# Patient Record
Sex: MALE | Race: WHITE | NOT HISPANIC OR LATINO | Employment: OTHER | URBAN - METROPOLITAN AREA
[De-identification: names, ages, dates, MRNs, and addresses within clinical notes are randomized per-mention and may not be internally consistent; named-entity substitution may affect disease eponyms.]

---

## 2017-04-26 ENCOUNTER — OFFICE VISIT (OUTPATIENT)
Dept: MEDICAL GROUP | Facility: MEDICAL CENTER | Age: 47
End: 2017-04-26

## 2017-04-26 VITALS
RESPIRATION RATE: 14 BRPM | HEIGHT: 71 IN | TEMPERATURE: 98.4 F | OXYGEN SATURATION: 96 % | SYSTOLIC BLOOD PRESSURE: 202 MMHG | DIASTOLIC BLOOD PRESSURE: 110 MMHG | HEART RATE: 70 BPM | WEIGHT: 300.2 LBS | BODY MASS INDEX: 42.03 KG/M2

## 2017-04-26 DIAGNOSIS — R79.89 LOW TESTOSTERONE LEVEL IN MALE: ICD-10-CM

## 2017-04-26 DIAGNOSIS — Z00.00 ROUTINE GENERAL MEDICAL EXAMINATION AT A HEALTH CARE FACILITY: Primary | ICD-10-CM

## 2017-04-26 DIAGNOSIS — R06.00 DYSPNEA, UNSPECIFIED TYPE: ICD-10-CM

## 2017-04-26 DIAGNOSIS — F17.200 TOBACCO USE DISORDER: ICD-10-CM

## 2017-04-26 DIAGNOSIS — Z96.641 STATUS POST RIGHT HIP REPLACEMENT: ICD-10-CM

## 2017-04-26 DIAGNOSIS — I11.0 HYPERTENSIVE CARDIOMEGALY WITH HEART FAILURE (HCC): ICD-10-CM

## 2017-04-26 DIAGNOSIS — M54.50 CHRONIC BILATERAL LOW BACK PAIN WITHOUT SCIATICA: ICD-10-CM

## 2017-04-26 DIAGNOSIS — I50.32 CHRONIC DIASTOLIC CONGESTIVE HEART FAILURE (HCC): ICD-10-CM

## 2017-04-26 DIAGNOSIS — I10 ESSENTIAL HYPERTENSION: ICD-10-CM

## 2017-04-26 DIAGNOSIS — Z13.6 ENCOUNTER FOR LIPID SCREENING FOR CARDIOVASCULAR DISEASE: ICD-10-CM

## 2017-04-26 DIAGNOSIS — D75.1 POLYCYTHEMIA SECONDARY TO SMOKING: ICD-10-CM

## 2017-04-26 DIAGNOSIS — R53.82 CHRONIC FATIGUE: ICD-10-CM

## 2017-04-26 DIAGNOSIS — Z13.220 ENCOUNTER FOR LIPID SCREENING FOR CARDIOVASCULAR DISEASE: ICD-10-CM

## 2017-04-26 DIAGNOSIS — I16.0 HYPERTENSIVE URGENCY: ICD-10-CM

## 2017-04-26 DIAGNOSIS — Z13.1 DIABETES MELLITUS SCREENING: ICD-10-CM

## 2017-04-26 DIAGNOSIS — G47.33 OSA (OBSTRUCTIVE SLEEP APNEA): ICD-10-CM

## 2017-04-26 DIAGNOSIS — G89.29 CHRONIC BILATERAL LOW BACK PAIN WITHOUT SCIATICA: ICD-10-CM

## 2017-04-26 PROCEDURE — 99215 OFFICE O/P EST HI 40 MIN: CPT | Performed by: FAMILY MEDICINE

## 2017-04-26 RX ORDER — METOPROLOL TARTRATE 50 MG/1
50 TABLET, FILM COATED ORAL 2 TIMES DAILY
Qty: 90 TAB | Refills: 0 | Status: SHIPPED | OUTPATIENT
Start: 2017-04-26 | End: 2017-05-18 | Stop reason: SDUPTHER

## 2017-04-26 RX ORDER — FUROSEMIDE 40 MG/1
40 TABLET ORAL DAILY
Qty: 30 TAB | Refills: 0 | Status: ON HOLD | OUTPATIENT
Start: 2017-04-26 | End: 2017-05-10

## 2017-04-26 RX ORDER — LISINOPRIL 40 MG/1
20 TABLET ORAL 2 TIMES DAILY
Qty: 90 TAB | Refills: 0 | Status: SHIPPED | OUTPATIENT
Start: 2017-04-26 | End: 2017-05-01

## 2017-04-26 ASSESSMENT — PATIENT HEALTH QUESTIONNAIRE - PHQ9: CLINICAL INTERPRETATION OF PHQ2 SCORE: 1

## 2017-04-26 NOTE — MR AVS SNAPSHOT
"        Giacomo Grandazeynep   2017 8:20 AM   Office Visit   MRN: [id number]    Department:  [place] [address]   Dept Phone:  [phone]    Description:  Male : 1970   Provider:  Aaron Chan M.D.           Reason for Visit     Medication Management HP changed medication pt has weakness, balance off, dropping things.       Allergies as of 2017     Allergen Noted Reactions    Naproxen 2016   Rash    generalized    Flu Virus Vaccine 2013   Anaphylaxis    Olmesartan-Amlodipine-Hctz 2013       Irregular heart beat, irregular BP, blurred vision, vertigo (reaction= 3 weeks ago)      You were diagnosed with     Routine general medical examination at a health care facility   [V70.0.ICD-9-CM]  -  Primary     Essential hypertension   [6971701]       Chronic diastolic congestive heart failure (CMS-HCC)   [337262]       Hypertensive cardiomegaly with heart failure (CMS-HCC)   [3077466]       Hypertensive urgency   [309611]       BMI 40.0-44.9, adult (CMS-HCC)   [964763]       ADRIANNA (obstructive sleep apnea)   [881264]       Chronic bilateral low back pain without sciatica   [1551101]       Status post right hip replacement   [736878]       Essential hypertension, malignant   [401.0.ICD-9-CM]       Diabetes mellitus screening   [329181]       Encounter for lipid screening for cardiovascular disease   [3297998]       Dyspnea, unspecified type   [1947424]       Chronic fatigue   [729106]       Low testosterone level in male   [4159153]         Vital Signs     Blood Pressure Pulse Temperature Respirations Height Weight    202/110 mmHg 70 36.9 °C (98.4 °F) 14 1.803 m (5' 11\") 136.17 kg (300 lb 3.2 oz)    Body Mass Index Oxygen Saturation Smoking Status             41.89 kg/m2 96% Current Every Day Smoker         Basic Information     Date Of Birth Sex Race Ethnicity Preferred Language    1970 Male White Non- English      Problem List              ICD-10-CM Priority Class Noted - " Resolved    ADRIANNA (obstructive sleep apnea) G47.33   7/4/2013 - Present    HTN (hypertension) I10   7/4/2013 - Present    Hypertensive urgency I16.0   12/1/2016 - Present    Hypertensive cardiomegaly with heart failure (CMS-HCC) I11.0   12/2/2016 - Present    Tobacco use disorder F17.200   12/2/2016 - Present    Polycythemia secondary to smoking D75.1   12/2/2016 - Present    BMI 40.0-44.9, adult (CMS-HCC) Z68.41   12/2/2016 - Present    Chronic diastolic congestive heart failure (CMS-HCC) I50.32   4/26/2017 - Present    Chronic bilateral low back pain without sciatica M54.5, G89.29   4/26/2017 - Present    Status post right hip replacement Z96.641   4/26/2017 - Present    Dyspnea R06.00   4/26/2017 - Present    Chronic fatigue R53.82   4/26/2017 - Present    Low testosterone level in male E29.1   4/26/2017 - Present      Health Maintenance        Date Due Completion Dates    IMM DTaP/Tdap/Td Vaccine (1 - Tdap) 2/21/1987 ---    IMM PNEUMOCOCCAL 19-64 (ADULT) MEDIUM RISK SERIES (1 of 1 - PPSV23) 2/21/1987 ---    COLONOSCOPY 11/17/2019 11/17/2014 (Declined), 5/10/2009 (Done)    Override on 11/17/2014: Patient Declined    Override on 5/10/2009: Done            Current Immunizations     No immunizations on file.      Below and/or attached are the medications your provider expects you to take. Review all of your home medications and newly ordered medications with your provider and/or pharmacist. Follow medication instructions as directed by your provider and/or pharmacist. Please keep your medication list with you and share with your provider. Update the information when medications are discontinued, doses are changed, or new medications (including over-the-counter products) are added; and carry medication information at all times in the event of emergency situations     Allergies:  NAPROXEN - Rash     FLU VIRUS VACCINE - Anaphylaxis     OLMESARTAN-AMLODIPINE-HCTZ - (reactions not documented)               Medications   Valid as of: April 26, 2017 -  9:20 AM    Generic Name Brand Name Tablet Size Instructions for use    Cetirizine HCl (Tab) ZYRTEC 10 MG Take 10 mg by mouth 2 Times a Day.        DiazePAM (Tab) VALIUM 10 MG Take 10 mg by mouth 2 times a day as needed for Anxiety.        Furosemide (Tab) LASIX 40 MG Take 1 Tab by mouth every day.        Hydrocodone-Acetaminophen (Tab) NORCO  MG Take 1 Tab by mouth every 6 hours.        Lisinopril (Tab) PRINIVIL, ZESTRIL 40 MG Take 0.5 Tabs by mouth 2 times a day.        Metoprolol Tartrate (Tab) LOPRESSOR 50 MG Take 1 Tab by mouth 2 times a day.        Minoxidil (Tab) LONITEN 10 MG Take 1 Tab by mouth every day.        OxyCODONE HCl (Tab) OXY-IR 30 MG Take 1 Tab by mouth every four hours as needed for Moderate Pain.        .                 Medicines prescribed today were sent to:     [id number] - [place], [place] - [address] [address]    [address] [facility] [place] [address]    Phone: [phone] Fax: [phone]    Open 24 Hours?: No      Medication refill instructions:       If your prescription bottle indicates you have medication refills left, it is not necessary to call your provider’s office. Please contact your pharmacy and they will refill your medication.    If your prescription bottle indicates you do not have any refills left, you may request refills at any time through one of the following ways: The online [employer] system (except Urgent Care), by calling your provider’s office, or by asking your pharmacy to contact your provider’s office with a refill request. Medication refills are processed only during regular business hours and may not be available until the next business day. Your provider may request additional information or to have a follow-up visit with you prior to refilling your medication.   *Please Note: Medication refills are assigned a new Rx number when refilled electronically. Your pharmacy may indicate that no refills were authorized even  though a new prescription for the same medication is available at the pharmacy. Please request the medicine by name with the pharmacy before contacting your provider for a refill.        Your To Do List     Future Labs/Procedures Complete By Expires    COMP METABOLIC PANEL  As directed 4/26/2018    HEMOGLOBIN A1C  As directed 4/26/2018    LIPID PROFILE  As directed 4/26/2018    Comments:    Fasting at least 8hours    TESTOSTERONE F&T MALE ADULT  As directed 4/26/2018      Referral     A referral request has been sent to our patient care coordination department. Please allow 3-5 business days for us to process this request and contact you either by phone or mail. If you do not hear from us by the 5th business day, please call us at [phone].        Other Notes About Your Plan     SIGNED OA:   11/17/2014  LAST UDS: 11/17/2014             [employer] Access Code: Activation code not generated  Current [employer] Status: Active          Quit Tobacco Information     Do you want to quit using tobacco?    Quitting tobacco decreases risks of cancer, heart and lung disease, increases life expectancy, improves sense of taste and smell, and increases spending money, among other benefits.    If you are thinking about quitting, we can help.  • [facility] Quit Tobacco Program: [phone]  o Program occurs weekly for four weeks and includes pharmacist consultation on products to support quitting smoking or chewing tobacco. A provider referral is needed for pharmacist consultation.  • Tobacco Users Help Hotline: 7-904-QUIT-NOW (586-9518) or https://[place].quitlogix.org/  o Free, confidential telephone and online coaching for [place] residents. Sessions are designed on a schedule that is convenient for you. Eligible clients receive free nicotine replacement therapy.  • Nationally: www.smokefree.gov  o Information and professional assistance to support both immediate and long-term needs as you become, and remain, a non-smoker.  Smokefree.gov allows you to choose the help that best fits your needs.

## 2017-04-26 NOTE — ASSESSMENT & PLAN NOTE
"Patient presents today with a blood pressure reading of 202/110 mmHg, states that he is experiencing: generalized weakness, feels that his balance is off, is randomly dropping things from his hands, and wife states that \"he is not himself,\" otherwise asymptomatic. Patient states that he was admitted most recently in December 2016 for symptomatic hypertensive emergency, and review of records reveals that he had a negative nuclear medicine stress test.     Patient states that his medications were changed from his previous (Lisinopril + Tenormin) to current (Minoxidil, Labetalol, with Labetalol apparently to be self-titrated anywhere from Qday to TID based on BP readings).    Of note, review of recent records reveals that  echocardiogram shows normal systolic function with EF 55%, but with Grade II diastolic dysfunction which was consistent with mild concentric hypertrophy.  On the other hand, review of recent nuclear medicine stress test demonstrates no myocardial infarct or inducible ischemia.    ROS is NEGATIVE for dizziness, generalized weakness/fatigue, vision/hearing changes, jaw pain/paresthesias, BUE pain/paresthesias/numbness/weakness, chest pain/pressure, palpitations, dyspnea, RUQ abdominal pain, oliguria/anuria, BLE edema.  "

## 2017-04-27 ENCOUNTER — APPOINTMENT (OUTPATIENT)
Dept: LAB | Facility: MEDICAL CENTER | Age: 47
End: 2017-04-27
Attending: FAMILY MEDICINE

## 2017-04-28 NOTE — ASSESSMENT & PLAN NOTE
Listed for historical purposes.  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

## 2017-04-28 NOTE — PROGRESS NOTES
"Subjective:     Chief Complaint   Patient presents with   • Medication Management     HP changed medication pt has weakness, balance off, dropping things.        History of Present Illness:  Giacomo Hunter is a 47 y.o. male established patient who presents today to discuss blood pressure management, and also to :    BMI 40.0-44.9, adult (CMS-HCC)  Patient acknowledges diagnosis of morbid obesity, has tried to change his diet to \" eating\" as described in dietary history below.      ROS is NEGATIVE for: cold or heat intolerance, anxiety/depression, chest pain/pressure, hair thickening/coarsening/falling out/thinning, skin changes, diarrhea/constipation.    ROS is NEGATIVE for blurred vision, polydipsia, polyuria, diaphoresis, palpitations, fatigue, irritability, flank pain, BLE paresthesias.    Dietary History  ---  B: English Muffin + Hardboiled Eggs (~10eggs per week) OR Cereal (Kashi) OR Ramen Noodles    L: [employer] Atlantic (Cold cuts, swiss cheese + parmesan, no oil/vinegar, no salt)    D: Roasted chicken OR Fish, veggies (sauteed, no-salt substitute), salad     Pickled [place] or other preserved meats (Sardines in oil, lox on bagels + cream cheese) -- 1x/week    Cheese 2-3x per week    Pizza -- 1x every 2months    Hypertensive urgency  Patient presents today with a blood pressure reading of 202/110 mmHg, states that he is experiencing: generalized weakness, feels that his balance is off, is randomly dropping things from his hands, and wife states that \"he is not himself,\" otherwise asymptomatic. Patient states that he was admitted most recently in December 2016 for symptomatic hypertensive emergency, and review of records reveals that he had a negative nuclear medicine stress test.     Patient states that his medications were changed from his previous (Lisinopril + Tenormin) to current (Minoxidil, Labetalol, with Labetalol apparently to be self-titrated anywhere from Qday to TID based on BP readings).    Of " note, review of recent records reveals that  echocardiogram shows normal systolic function with EF 55%, but with Grade II diastolic dysfunction which was consistent with mild concentric hypertrophy.  On the other hand, review of recent nuclear medicine stress test demonstrates no myocardial infarct or inducible ischemia.    ROS is NEGATIVE for dizziness, generalized weakness/fatigue, vision/hearing changes, jaw pain/paresthesias, BUE pain/paresthesias/numbness/weakness, chest pain/pressure, palpitations, dyspnea, RUQ abdominal pain, oliguria/anuria, BLE edema.    Hypertensive cardiomegaly with heart failure (CMS-HCC)  Please see notes from same date of service 04/26/2017 re: hypertensive urgency.    HTN (hypertension)  Please see notes from same date of service 04/26/2017 re: Hypertensive Urgency.    Chronic diastolic congestive heart failure (CMS-HCC)  Patient states that--if he isn't taking diuretics--he gets dyspneic walking 50-100feet.  Additionally, patient would notice fluid accumulation in BLE ankles without the diuretics.  He states that Lasix was more effective than Metozalone.  Patient is taking Potassium supplements at 20mEq total per day.    Otherwise, please see notes from same date of service 04/26/2017 re: Hypertensive Urgency.    Chronic fatigue  Patient states that he is always tired.      ROS is NEGATIVE for generalized pallor, dizziness, lightheadedness, blurred vision, chest pain/pressure, palpitations, tachycardia, dyspnea, hematemesis, hemoptysis, diarrhea, hematochezia, melena, hematuria    ROS is NEGATIVE for: cold or heat intolerance, anxiety/depression, chest pain/pressure, hair thickening/coarsening/falling out/thinning, skin changes, diarrhea/constipation.    ROS is NEGATIVE for blurred vision, polydipsia, polyuria, diaphoresis, palpitations, fatigue, irritability, flank pain, BLE paresthesias.    Polycythemia secondary to smoking  Patient noted to have polycythemia in the past.  This  "could be secondary to underlying undiagnosed pulmonary issues vs. Effect of smoking.  Otherwise, please see notes from same date of service 04/26/2017 re: Fatigue.    Low testosterone level in male  Patient states that he was formerly told he had low levels of testosterone but \"my wife and I didn't want me to take the testosterone, at that time.\"     ROS is NEGATIVE for testicular trauma or shrinkage, use of anabolic steroids.    Tobacco use disorder  Patient has switched from smoking cigarettes to vaping.    ROS is POSITIVE for dyspnea on exertion, otherwise is NEGATIVE for fevers, chills, tachypnea, respiratory distress, cyanotic skin changes, daytime hypersomnolence, headaches upon awakening.    Status post right hip replacement  Listed for historical purposes.  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        Patient Active Problem List    Diagnosis Date Noted   • Chronic diastolic congestive heart failure (CMS-HCC) 04/26/2017   • Chronic bilateral low back pain without sciatica 04/26/2017   • Status post right hip replacement 04/26/2017   • Dyspnea 04/26/2017   • Chronic fatigue 04/26/2017   • Low testosterone level in male 04/26/2017   • Hypertensive cardiomegaly with heart failure (CMS-HCC) 12/02/2016   • Tobacco use disorder 12/02/2016   • Polycythemia secondary to smoking 12/02/2016   • BMI 40.0-44.9, adult (CMS-HCC) 12/02/2016   • Hypertensive urgency 12/01/2016   • ADRIANNA (obstructive sleep apnea) 07/04/2013   • HTN (hypertension) 07/04/2013       Additional History:   Allergies:    Naproxen; Flu virus vaccine; and Olmesartan-amlodipine-hctz     Current Medications:     Current Outpatient Prescriptions   Medication Sig Dispense Refill   • lisinopril (PRINIVIL, ZESTRIL) 40 MG tablet Take 0.5 Tabs by mouth 2 times a day. 90 Tab 0   • metoprolol (LOPRESSOR) 50 MG Tab Take 1 Tab by mouth 2 times " "a day. 90 Tab 0   • furosemide (LASIX) 40 MG Tab Take 1 Tab by mouth every day. 30 Tab 0   • diazepam (VALIUM) 10 MG tablet Take 10 mg by mouth 2 times a day as needed for Anxiety.     • oxycodone (OXY-IR) 30 MG immediate release tablet Take 1 Tab by mouth every four hours as needed for Moderate Pain. 120 Tab 0   • cetirizine (ZYRTEC) 10 MG TABS Take 10 mg by mouth 2 Times a Day.     • hydrocodone/acetaminophen (NORCO)  MG Tab Take 1 Tab by mouth every 6 hours.       No current facility-administered medications for this visit.        Social History:     Social History   Substance Use Topics   • Smoking status: Current Every Day Smoker -- 2.50 packs/day for 25 years     Types: Cigarettes     Start date: 01/01/1990   • Smokeless tobacco: Never Used      Comment: 2-3ppd [id number]; Currently Vaping   • Alcohol Use: 6.0 oz/week     7 Glasses of wine, 5 Cans of beer per week      Comment: 1 glass of wine with dinner       ROS:     - NOTE: All other systems reviewed and are negative, except as in HPI.     Objective:   Physical Exam:    Vitals: Blood pressure 202/110, pulse 70, temperature 36.9 °C (98.4 °F), resp. rate 14, height 1.803 m (5' 11\"), weight 136.17 kg (300 lb 3.2 oz), SpO2 96 %.   BMI: Body mass index is 41.89 kg/(m^2).   General/Constitutional: Vitals as above, Well nourished, well developed male in no acute distress   Head/Eyes: Head is grossly normal & atraumatic, bilateral conjunctivae clear and not injected, bilateral EOMI, bilateral PERRL   ENT: Bilateral external ears grossly normal in appearance, Hearing grossly intact, External nares normal in appearance and without discharge/bleeding   Respiratory: No respiratory distress, bilateral lungs are clear to ausculation in all lung fields (anterior/lateral/posterior), no wheezing/rhonchi/rales   Cardiovascular: Regular rate and rhythm without murmur/gallops/rubs, distal pulses are intact and equal bilaterally (radial, posterior tibial), bilateral lower " extremity trace pitting edema   MSK: Gait grossly normal & not antalgic   Integumentary: No apparent rashes   Psych: Judgment grossly appropriate, no apparent depression/anxiety    Health Maintenance:      - I have requested previous records, and will update accordingly.    Imaging/Labs:      - I have requested previous records, and will update accordingly.    Assessment and Plan:   1. Hypertensive urgency  2. Hypertensive cardiomegaly with heart failure (CMS-HCC)  3. Essential hypertension  Uncontrolled, mildly symptomatic.  Current BP therapy ineffective.  Stop Minoxidil, stop Labetalol.  Start Metoprolol + Lisinopril as below.  Labs as below to evaluate for comorbidities.  Urine microalbumin to creatinine ratio to evaluate for hypertensive or diabetic nephropathy.   - lisinopril (PRINIVIL, ZESTRIL) 40 MG tablet; Take 0.5 Tabs by mouth 2 times a day.  Dispense: 90 Tab; Refill: 0   - metoprolol (LOPRESSOR) 50 MG Tab; Take 1 Tab by mouth 2 times a day.  Dispense: 90 Tab; Refill: 0   - HEMOGLOBIN A1C; Future   - COMP METABOLIC PANEL; Future   - LIPID PROFILE; Future   - MICROALB/CREAT RATIO RAND. UR    4. Chronic diastolic congestive heart failure (CMS-HCC)  Uncontrolled   - furosemide (LASIX) 40 MG Tab; Take 1 Tab by mouth every day.  Dispense: 30 Tab; Refill: 0   - MICROALB/CREAT RATIO RAND. UR    5. BMI 40.0-44.9, adult (CMS-HCC)  6. ADRIANNA (obstructive sleep apnea)  7. Dyspnea, unspecified type  BMI uncontrolled.   Suspect ADRIANNA, possible OHS.  Polysomnography + PFTs to evaluate.   - REFERRAL TO SLEEP STUDIES   - PULMONARY FUNCTION TESTS Test requested: Complete Pulmonary Function Test    8. Chronic bilateral low back pain without sciatica  Uncontrolled.  Referral to pain clinic for further evaluation/management.   - REFERRAL TO PAIN CLINIC    9. Chronic fatigue  10. Polycythemia secondary to smoking  11. Low testosterone level in male  Uncontrolled.  Evalaute with labs as above, also for anemia vs. Low testosterone  as below.   - TESTOSTERONE F&T MALE ADULT; Future   - CBC WITH DIFFERENTIAL; Future    12. Tobacco use disorder  13. Status post right hip replacement  Both of these listed for historical purposes.    14. Routine general medical examination at a health care facility   - HEMOGLOBIN A1C; Future   - COMP METABOLIC PANEL; Future   - LIPID PROFILE; Future    15. Encounter for lipid screening for cardiovascular disease   - LIPID PROFILE; Future    16. Diabetes mellitus screening   - HEMOGLOBIN A1C; Future    NOTE: A total of 40minutes was spent in direct face-to-face time with the patient, of which over 50% of the time was spent in counseling and/or coordination of care, the contents of which are described in this note.    RTC in: 1 week for lab follow-up and management of chronic conditions..    PLEASE NOTE: This dictation was created using voice recognition software. I have made every reasonable attempt to correct obvious errors, but I expect that there are errors of grammar and possibly content that I did not discover before finalizing the note.

## 2017-04-28 NOTE — ASSESSMENT & PLAN NOTE
Patient has switched from smoking cigarettes to vaping.    ROS is POSITIVE for dyspnea on exertion, otherwise is NEGATIVE for fevers, chills, tachypnea, respiratory distress, cyanotic skin changes, daytime hypersomnolence, headaches upon awakening.

## 2017-04-28 NOTE — ASSESSMENT & PLAN NOTE
"Patient states that he was formerly told he had low levels of testosterone but \"my wife and I didn't want me to take the testosterone, at that time.\"     ROS is NEGATIVE for testicular trauma or shrinkage, use of anabolic steroids.  "

## 2017-04-28 NOTE — ASSESSMENT & PLAN NOTE
Patient states that he is always tired.      ROS is NEGATIVE for generalized pallor, dizziness, lightheadedness, blurred vision, chest pain/pressure, palpitations, tachycardia, dyspnea, hematemesis, hemoptysis, diarrhea, hematochezia, melena, hematuria    ROS is NEGATIVE for: cold or heat intolerance, anxiety/depression, chest pain/pressure, hair thickening/coarsening/falling out/thinning, skin changes, diarrhea/constipation.    ROS is NEGATIVE for blurred vision, polydipsia, polyuria, diaphoresis, palpitations, fatigue, irritability, flank pain, BLE paresthesias.

## 2017-04-28 NOTE — ASSESSMENT & PLAN NOTE
Patient noted to have polycythemia in the past.  This could be secondary to underlying undiagnosed pulmonary issues vs. Effect of smoking.  Otherwise, please see notes from same date of service 04/26/2017 re: Fatigue.

## 2017-04-28 NOTE — ASSESSMENT & PLAN NOTE
Patient states that--if he isn't taking diuretics--he gets dyspneic walking 50-100feet.  Additionally, patient would notice fluid accumulation in BLE ankles without the diuretics.  He states that Lasix was more effective than Metozalone.  Patient is taking Potassium supplements at 20mEq total per day.    Otherwise, please see notes from same date of service 04/26/2017 re: Hypertensive Urgency.

## 2017-05-01 ENCOUNTER — APPOINTMENT (OUTPATIENT)
Dept: RADIOLOGY | Facility: MEDICAL CENTER | Age: 47
End: 2017-05-01
Attending: EMERGENCY MEDICINE
Payer: COMMERCIAL

## 2017-05-01 ENCOUNTER — RESOLUTE PROFESSIONAL BILLING HOSPITAL PROF FEE (OUTPATIENT)
Dept: HOSPITALIST | Facility: MEDICAL CENTER | Age: 47
End: 2017-05-01
Payer: COMMERCIAL

## 2017-05-01 ENCOUNTER — HOSPITAL ENCOUNTER (OUTPATIENT)
Facility: MEDICAL CENTER | Age: 47
End: 2017-05-02
Attending: EMERGENCY MEDICINE | Admitting: HOSPITALIST
Payer: COMMERCIAL

## 2017-05-01 DIAGNOSIS — R07.9 CHEST PAIN, UNSPECIFIED TYPE: ICD-10-CM

## 2017-05-01 DIAGNOSIS — I50.32 CHRONIC DIASTOLIC CONGESTIVE HEART FAILURE (HCC): ICD-10-CM

## 2017-05-01 DIAGNOSIS — I10 ESSENTIAL HYPERTENSION: ICD-10-CM

## 2017-05-01 DIAGNOSIS — R06.00 DYSPNEA, UNSPECIFIED TYPE: ICD-10-CM

## 2017-05-01 PROBLEM — E66.01 MORBID OBESITY DUE TO EXCESS CALORIES (HCC): Status: ACTIVE | Noted: 2017-05-01

## 2017-05-01 PROBLEM — I50.20 SYSTOLIC HEART FAILURE, STAGE B (HCC): Status: ACTIVE | Noted: 2017-05-01

## 2017-05-01 PROBLEM — D75.1 POLYCYTHEMIA: Status: ACTIVE | Noted: 2017-05-01

## 2017-05-01 LAB
ALBUMIN SERPL BCP-MCNC: 4.7 G/DL (ref 3.2–4.9)
ALBUMIN/GLOB SERPL: 1.7 G/DL
ALP SERPL-CCNC: 59 U/L (ref 30–99)
ALT SERPL-CCNC: 61 U/L (ref 2–50)
ANION GAP SERPL CALC-SCNC: 10 MMOL/L (ref 0–11.9)
AST SERPL-CCNC: 25 U/L (ref 12–45)
BASOPHILS # BLD AUTO: 0.8 % (ref 0–1.8)
BASOPHILS # BLD: 0.09 K/UL (ref 0–0.12)
BILIRUB SERPL-MCNC: 1.2 MG/DL (ref 0.1–1.5)
BNP SERPL-MCNC: 134 PG/ML (ref 0–100)
BUN SERPL-MCNC: 27 MG/DL (ref 8–22)
CALCIUM SERPL-MCNC: 9 MG/DL (ref 8.5–10.5)
CHLORIDE SERPL-SCNC: 102 MMOL/L (ref 96–112)
CO2 SERPL-SCNC: 24 MMOL/L (ref 20–33)
CREAT SERPL-MCNC: 1.25 MG/DL (ref 0.5–1.4)
DEPRECATED D DIMER PPP IA-ACNC: <200 NG/ML(D-DU)
EKG IMPRESSION: NORMAL
EOSINOPHIL # BLD AUTO: 0.24 K/UL (ref 0–0.51)
EOSINOPHIL NFR BLD: 2 % (ref 0–6.9)
ERYTHROCYTE [DISTWIDTH] IN BLOOD BY AUTOMATED COUNT: 42.5 FL (ref 35.9–50)
EST. AVERAGE GLUCOSE BLD GHB EST-MCNC: 114 MG/DL
GFR SERPL CREATININE-BSD FRML MDRD: >60 ML/MIN/1.73 M 2
GLOBULIN SER CALC-MCNC: 2.8 G/DL (ref 1.9–3.5)
GLUCOSE SERPL-MCNC: 109 MG/DL (ref 65–99)
HBA1C MFR BLD: 5.6 % (ref 0–5.6)
HCT VFR BLD AUTO: 48.8 % (ref 42–52)
HGB BLD-MCNC: 17.6 G/DL (ref 14–18)
IMM GRANULOCYTES # BLD AUTO: 0.04 K/UL (ref 0–0.11)
IMM GRANULOCYTES NFR BLD AUTO: 0.3 % (ref 0–0.9)
LACTATE BLD-SCNC: 2.1 MMOL/L (ref 0.5–2)
LYMPHOCYTES # BLD AUTO: 0.88 K/UL (ref 1–4.8)
LYMPHOCYTES NFR BLD: 7.5 % (ref 22–41)
MAGNESIUM SERPL-MCNC: 1.6 MG/DL (ref 1.5–2.5)
MCH RBC QN AUTO: 32.4 PG (ref 27–33)
MCHC RBC AUTO-ENTMCNC: 36.1 G/DL (ref 33.7–35.3)
MCV RBC AUTO: 89.9 FL (ref 81.4–97.8)
MONOCYTES # BLD AUTO: 0.42 K/UL (ref 0–0.85)
MONOCYTES NFR BLD AUTO: 3.6 % (ref 0–13.4)
NEUTROPHILS # BLD AUTO: 10.06 K/UL (ref 1.82–7.42)
NEUTROPHILS NFR BLD: 85.8 % (ref 44–72)
NRBC # BLD AUTO: 0 K/UL
NRBC BLD AUTO-RTO: 0 /100 WBC
PLATELET # BLD AUTO: 126 K/UL (ref 164–446)
PMV BLD AUTO: 11.3 FL (ref 9–12.9)
POTASSIUM SERPL-SCNC: 4 MMOL/L (ref 3.6–5.5)
PROT SERPL-MCNC: 7.5 G/DL (ref 6–8.2)
RBC # BLD AUTO: 5.43 M/UL (ref 4.7–6.1)
SODIUM SERPL-SCNC: 136 MMOL/L (ref 135–145)
TROPONIN I SERPL-MCNC: 0.02 NG/ML (ref 0–0.04)
TROPONIN I SERPL-MCNC: 0.02 NG/ML (ref 0–0.04)
TSH SERPL DL<=0.005 MIU/L-ACNC: 0.87 UIU/ML (ref 0.3–3.7)
WBC # BLD AUTO: 11.7 K/UL (ref 4.8–10.8)

## 2017-05-01 PROCEDURE — 99285 EMERGENCY DEPT VISIT HI MDM: CPT

## 2017-05-01 PROCEDURE — 700117 HCHG RX CONTRAST REV CODE 255: Performed by: EMERGENCY MEDICINE

## 2017-05-01 PROCEDURE — 700102 HCHG RX REV CODE 250 W/ 637 OVERRIDE(OP): Performed by: HOSPITALIST

## 2017-05-01 PROCEDURE — 71010 DX-CHEST-PORTABLE (1 VIEW): CPT

## 2017-05-01 PROCEDURE — 80053 COMPREHEN METABOLIC PANEL: CPT

## 2017-05-01 PROCEDURE — G0378 HOSPITAL OBSERVATION PER HR: HCPCS

## 2017-05-01 PROCEDURE — 700102 HCHG RX REV CODE 250 W/ 637 OVERRIDE(OP): Performed by: INTERNAL MEDICINE

## 2017-05-01 PROCEDURE — 74175 CTA ABDOMEN W/CONTRAST: CPT

## 2017-05-01 PROCEDURE — 83036 HEMOGLOBIN GLYCOSYLATED A1C: CPT

## 2017-05-01 PROCEDURE — 700102 HCHG RX REV CODE 250 W/ 637 OVERRIDE(OP): Performed by: EMERGENCY MEDICINE

## 2017-05-01 PROCEDURE — A9270 NON-COVERED ITEM OR SERVICE: HCPCS | Performed by: INTERNAL MEDICINE

## 2017-05-01 PROCEDURE — 700101 HCHG RX REV CODE 250: Performed by: EMERGENCY MEDICINE

## 2017-05-01 PROCEDURE — 84443 ASSAY THYROID STIM HORMONE: CPT

## 2017-05-01 PROCEDURE — 36415 COLL VENOUS BLD VENIPUNCTURE: CPT

## 2017-05-01 PROCEDURE — A9270 NON-COVERED ITEM OR SERVICE: HCPCS | Performed by: HOSPITALIST

## 2017-05-01 PROCEDURE — 93005 ELECTROCARDIOGRAM TRACING: CPT

## 2017-05-01 PROCEDURE — 83735 ASSAY OF MAGNESIUM: CPT

## 2017-05-01 PROCEDURE — 85025 COMPLETE CBC W/AUTO DIFF WBC: CPT

## 2017-05-01 PROCEDURE — 96375 TX/PRO/DX INJ NEW DRUG ADDON: CPT

## 2017-05-01 PROCEDURE — A9270 NON-COVERED ITEM OR SERVICE: HCPCS | Performed by: EMERGENCY MEDICINE

## 2017-05-01 PROCEDURE — 96374 THER/PROPH/DIAG INJ IV PUSH: CPT

## 2017-05-01 PROCEDURE — 83880 ASSAY OF NATRIURETIC PEPTIDE: CPT

## 2017-05-01 PROCEDURE — 99220 PR INITIAL OBSERVATION CARE,LEVL III: CPT | Performed by: HOSPITALIST

## 2017-05-01 PROCEDURE — 85379 FIBRIN DEGRADATION QUANT: CPT

## 2017-05-01 PROCEDURE — 84484 ASSAY OF TROPONIN QUANT: CPT

## 2017-05-01 PROCEDURE — 700111 HCHG RX REV CODE 636 W/ 250 OVERRIDE (IP): Performed by: HOSPITALIST

## 2017-05-01 PROCEDURE — 83605 ASSAY OF LACTIC ACID: CPT

## 2017-05-01 RX ORDER — LISINOPRIL 20 MG/1
40 TABLET ORAL 2 TIMES DAILY
Status: DISCONTINUED | OUTPATIENT
Start: 2017-05-01 | End: 2017-05-02 | Stop reason: HOSPADM

## 2017-05-01 RX ORDER — HEPARIN SODIUM 5000 [USP'U]/ML
5000 INJECTION, SOLUTION INTRAVENOUS; SUBCUTANEOUS EVERY 8 HOURS
Status: DISCONTINUED | OUTPATIENT
Start: 2017-05-02 | End: 2017-05-02 | Stop reason: HOSPADM

## 2017-05-01 RX ORDER — HYDRALAZINE HYDROCHLORIDE 25 MG/1
50 TABLET, FILM COATED ORAL EVERY 6 HOURS
Status: DISCONTINUED | OUTPATIENT
Start: 2017-05-02 | End: 2017-05-02 | Stop reason: HOSPADM

## 2017-05-01 RX ORDER — LORAZEPAM 2 MG/ML
0.5 INJECTION INTRAMUSCULAR EVERY 6 HOURS PRN
Status: DISCONTINUED | OUTPATIENT
Start: 2017-05-01 | End: 2017-05-02 | Stop reason: HOSPADM

## 2017-05-01 RX ORDER — ZOLPIDEM TARTRATE 5 MG/1
5 TABLET ORAL
Status: DISCONTINUED | OUTPATIENT
Start: 2017-05-01 | End: 2017-05-02 | Stop reason: HOSPADM

## 2017-05-01 RX ORDER — AMOXICILLIN 250 MG
2 CAPSULE ORAL 2 TIMES DAILY
Status: DISCONTINUED | OUTPATIENT
Start: 2017-05-01 | End: 2017-05-02 | Stop reason: HOSPADM

## 2017-05-01 RX ORDER — FUROSEMIDE 10 MG/ML
80 INJECTION INTRAMUSCULAR; INTRAVENOUS
Status: DISCONTINUED | OUTPATIENT
Start: 2017-05-01 | End: 2017-05-01

## 2017-05-01 RX ORDER — LORAZEPAM 1 MG/1
0.5 TABLET ORAL EVERY 6 HOURS PRN
Status: DISCONTINUED | OUTPATIENT
Start: 2017-05-01 | End: 2017-05-02 | Stop reason: HOSPADM

## 2017-05-01 RX ORDER — ISOSORBIDE DINITRATE 10 MG/1
10 TABLET ORAL EVERY 8 HOURS
Status: DISCONTINUED | OUTPATIENT
Start: 2017-05-01 | End: 2017-05-01

## 2017-05-01 RX ORDER — POLYETHYLENE GLYCOL 3350 17 G/17G
1 POWDER, FOR SOLUTION ORAL
Status: DISCONTINUED | OUTPATIENT
Start: 2017-05-01 | End: 2017-05-02 | Stop reason: HOSPADM

## 2017-05-01 RX ORDER — CLONIDINE HYDROCHLORIDE 0.1 MG/1
0.1 TABLET ORAL EVERY 6 HOURS PRN
Status: DISCONTINUED | OUTPATIENT
Start: 2017-05-01 | End: 2017-05-02 | Stop reason: HOSPADM

## 2017-05-01 RX ORDER — FUROSEMIDE 10 MG/ML
20 INJECTION INTRAMUSCULAR; INTRAVENOUS
Status: DISCONTINUED | OUTPATIENT
Start: 2017-05-02 | End: 2017-05-02 | Stop reason: HOSPADM

## 2017-05-01 RX ORDER — BISACODYL 10 MG
10 SUPPOSITORY, RECTAL RECTAL
Status: DISCONTINUED | OUTPATIENT
Start: 2017-05-01 | End: 2017-05-02 | Stop reason: HOSPADM

## 2017-05-01 RX ORDER — LABETALOL HYDROCHLORIDE 5 MG/ML
10 INJECTION, SOLUTION INTRAVENOUS EVERY 4 HOURS PRN
Status: DISCONTINUED | OUTPATIENT
Start: 2017-05-01 | End: 2017-05-02 | Stop reason: HOSPADM

## 2017-05-01 RX ORDER — DIAZEPAM 5 MG/1
10 TABLET ORAL 2 TIMES DAILY PRN
Status: DISCONTINUED | OUTPATIENT
Start: 2017-05-01 | End: 2017-05-02 | Stop reason: HOSPADM

## 2017-05-01 RX ORDER — CETIRIZINE HYDROCHLORIDE 10 MG/1
10 TABLET ORAL DAILY
Status: DISCONTINUED | OUTPATIENT
Start: 2017-05-02 | End: 2017-05-02 | Stop reason: HOSPADM

## 2017-05-01 RX ORDER — NITROGLYCERIN 0.4 MG/1
0.4 TABLET SUBLINGUAL ONCE
Status: COMPLETED | OUTPATIENT
Start: 2017-05-01 | End: 2017-05-01

## 2017-05-01 RX ORDER — AMLODIPINE BESYLATE 5 MG/1
5 TABLET ORAL
Status: DISCONTINUED | OUTPATIENT
Start: 2017-05-01 | End: 2017-05-01

## 2017-05-01 RX ORDER — PROMETHAZINE HYDROCHLORIDE 25 MG/1
12.5-25 TABLET ORAL EVERY 4 HOURS PRN
Status: DISCONTINUED | OUTPATIENT
Start: 2017-05-01 | End: 2017-05-02 | Stop reason: HOSPADM

## 2017-05-01 RX ORDER — DOXAZOSIN 2 MG/1
2 TABLET ORAL
Status: DISCONTINUED | OUTPATIENT
Start: 2017-05-01 | End: 2017-05-02 | Stop reason: HOSPADM

## 2017-05-01 RX ORDER — ACETAMINOPHEN 325 MG/1
650 TABLET ORAL EVERY 6 HOURS PRN
Status: DISCONTINUED | OUTPATIENT
Start: 2017-05-01 | End: 2017-05-02 | Stop reason: HOSPADM

## 2017-05-01 RX ORDER — HYDROCODONE BITARTRATE AND ACETAMINOPHEN 10; 325 MG/1; MG/1
1 TABLET ORAL EVERY 6 HOURS
Status: DISCONTINUED | OUTPATIENT
Start: 2017-05-01 | End: 2017-05-02 | Stop reason: HOSPADM

## 2017-05-01 RX ORDER — OXYCODONE HYDROCHLORIDE 10 MG/1
30 TABLET ORAL EVERY 6 HOURS PRN
Status: DISCONTINUED | OUTPATIENT
Start: 2017-05-01 | End: 2017-05-02 | Stop reason: HOSPADM

## 2017-05-01 RX ORDER — PROMETHAZINE HYDROCHLORIDE 25 MG/1
12.5-25 SUPPOSITORY RECTAL EVERY 4 HOURS PRN
Status: DISCONTINUED | OUTPATIENT
Start: 2017-05-01 | End: 2017-05-02 | Stop reason: HOSPADM

## 2017-05-01 RX ORDER — LISINOPRIL 40 MG/1
40 TABLET ORAL 2 TIMES DAILY
COMMUNITY
End: 2017-05-18 | Stop reason: SDUPTHER

## 2017-05-01 RX ORDER — HYDROCODONE BITARTRATE AND ACETAMINOPHEN 10; 325 MG/1; MG/1
1 TABLET ORAL EVERY 6 HOURS
Status: DISCONTINUED | OUTPATIENT
Start: 2017-05-02 | End: 2017-05-01

## 2017-05-01 RX ORDER — LABETALOL HYDROCHLORIDE 5 MG/ML
20 INJECTION, SOLUTION INTRAVENOUS ONCE
Status: COMPLETED | OUTPATIENT
Start: 2017-05-01 | End: 2017-05-01

## 2017-05-01 RX ORDER — ONDANSETRON 2 MG/ML
4 INJECTION INTRAMUSCULAR; INTRAVENOUS EVERY 4 HOURS PRN
Status: DISCONTINUED | OUTPATIENT
Start: 2017-05-01 | End: 2017-05-02 | Stop reason: HOSPADM

## 2017-05-01 RX ORDER — ONDANSETRON 4 MG/1
4 TABLET, ORALLY DISINTEGRATING ORAL EVERY 4 HOURS PRN
Status: DISCONTINUED | OUTPATIENT
Start: 2017-05-01 | End: 2017-05-02 | Stop reason: HOSPADM

## 2017-05-01 RX ADMIN — FUROSEMIDE 80 MG: 10 INJECTION, SOLUTION INTRAVENOUS at 20:50

## 2017-05-01 RX ADMIN — DOXAZOSIN 2 MG: 2 TABLET ORAL at 23:38

## 2017-05-01 RX ADMIN — IOHEXOL 100 ML: 350 INJECTION, SOLUTION INTRAVENOUS at 18:21

## 2017-05-01 RX ADMIN — ISOSORBIDE DINITRATE 10 MG: 10 TABLET ORAL at 22:00

## 2017-05-01 RX ADMIN — LABETALOL HYDROCHLORIDE 20 MG: 5 INJECTION INTRAVENOUS at 17:32

## 2017-05-01 RX ADMIN — HYDRALAZINE HYDROCHLORIDE 50 MG: 50 TABLET ORAL at 22:00

## 2017-05-01 RX ADMIN — LISINOPRIL 40 MG: 20 TABLET ORAL at 20:36

## 2017-05-01 RX ADMIN — HYDROCODONE BITARTRATE AND ACETAMINOPHEN 1 TABLET: 10; 325 TABLET ORAL at 20:54

## 2017-05-01 RX ADMIN — NITROGLYCERIN 0.4 MG: 0.4 TABLET SUBLINGUAL at 17:25

## 2017-05-01 RX ADMIN — METOPROLOL TARTRATE 50 MG: 25 TABLET, FILM COATED ORAL at 23:38

## 2017-05-01 ASSESSMENT — ENCOUNTER SYMPTOMS
SHORTNESS OF BREATH: 1
VOMITING: 0
NAUSEA: 0
ORTHOPNEA: 1
ABDOMINAL PAIN: 0
DIARRHEA: 0
BACK PAIN: 1

## 2017-05-01 ASSESSMENT — PAIN SCALES - GENERAL
PAINLEVEL_OUTOF10: 0
PAINLEVEL_OUTOF10: 9
PAINLEVEL_OUTOF10: 0
PAINLEVEL_OUTOF10: 0

## 2017-05-01 ASSESSMENT — PAIN SCALES - WONG BAKER
WONGBAKER_NUMERICALRESPONSE: DOESN'T HURT AT ALL

## 2017-05-01 ASSESSMENT — LIFESTYLE VARIABLES
EVER_SMOKED: NEVER
DO YOU DRINK ALCOHOL: NO

## 2017-05-01 NOTE — ED NOTES
Pt rounded on in senior lounge for increased respiratory rate and WOB. VS obtained. Charge RN is aware of pt. Pt informed of plan of care. 96% on RA, , /105. Instructed on deep breathing.

## 2017-05-01 NOTE — ED NOTES
"Ambulatory to triage with   Chief Complaint   Patient presents with   • Shortness of Breath     Sudden onset of SOB today   • Dizziness   • Hypertension     265/178 at home. Took an \"extra BP medication\" and BP is now 152/101   • Arm Pain     Bilateral arm pain   EKG completed. Pt and wife having difficulty answering questions and reporting hx. Charge RN is aware of pt. Pt placed in [facility].   HR 98; RR 26.       "

## 2017-05-01 NOTE — IP AVS SNAPSHOT
" Home Care Instructions                                                                                                                  Name:Giacomo Hunter  Medical Record Number:[id number]  CSN: [id number]    YOB: 1970   Age: 47 y.o.  Sex: male  HT:1.803 m (5' 11\") WT: 135 kg (297 lb 9.9 oz)          Admit Date: 5/1/2017     Discharge Date:   Today's Date: 5/2/2017  Attending Doctor:  Dio Gould M.D.                  Allergies:  Naproxen; Flu virus vaccine; and Olmesartan-amlodipine-hctz            Discharge Instructions       Discharge Instructions    Discharged to home by car with relative. Discharged via walking, hospital escort: Yes.  Special equipment needed: Not Applicable    Be sure to schedule a follow-up appointment with your primary care doctor or any specialists as instructed.     Discharge Plan:   Diet Plan: Discussed  Activity Level: Discussed  Confirmed Follow up Appointment: Patient to Call and Schedule Appointment  Confirmed Symptoms Management: Discussed  Medication Reconciliation Updated: Yes  Influenza Vaccine Indication: Patient Refuses    I understand that a diet low in cholesterol, fat, and sodium is recommended for good health. Unless I have been given specific instructions below for another diet, I accept this instruction as my diet prescription.   Other diet: Heart Healthy    Special Instructions: Follow up with primary care physician, sleep study and cardiology.  Activity as tolerated.  · Is patient discharged on Warfarin / Coumadin?   No     · Is patient Post Blood Transfusion?  No    Depression / Suicide Risk    As you are discharged from this [facility] facility, it is important to learn how to keep safe from harming yourself.    Recognize the warning signs:  · Abrupt changes in personality, positive or negative- including increase in energy   · Giving away possessions  · Change in eating patterns- significant weight changes-  positive or negative  · Change in " sleeping patterns- unable to sleep or sleeping all the time   · Unwillingness or inability to communicate  · Depression  · Unusual sadness, discouragement and loneliness  · Talk of wanting to die  · Neglect of personal appearance   · Rebelliousness- reckless behavior  · Withdrawal from people/activities they love  · Confusion- inability to concentrate     If you or a loved one observes any of these behaviors or has concerns about self-harm, here's what you can do:  · Talk about it- your feelings and reasons for harming yourself  · Remove any means that you might use to hurt yourself (examples: pills, rope, extension cords, firearm)  · Get professional help from the community (Mental Health, Substance Abuse, psychological counseling)  · Do not be alone:Call your Safe Contact- someone whom you trust who will be there for you.  · Call your local CRISIS HOTLINE 739-5653 or 929-044-9793  · Call your local Children's Mobile Crisis Response Team [place] (866) 775-3166 or www."[employer]"  · Call the toll free National Suicide Prevention Hotlines   · National Suicide Prevention Lifeline 248-012-KRMF (1214)  · National Hope Line Network 800-SUICIDE (457-2503)  Chest Pain Observation  It is often hard to give a specific diagnosis for the cause of chest pain. Among other possibilities your symptoms might be caused by inadequate oxygen delivery to your heart (angina). Angina that is not treated or evaluated can lead to a heart attack (myocardial infarction) or death.  Blood tests, electrocardiograms, and X-rays may have been done to help determine a possible cause of your chest pain. After evaluation and observation, your health care provider has determined that it is unlikely your pain was caused by an unstable condition that requires hospitalization. However, a full evaluation of your pain may need to be completed, with additional diagnostic testing as directed. It is very important to keep your follow-up  appointments. Not keeping your follow-up appointments could result in permanent heart damage, disability, or death. If there is any problem keeping your follow-up appointments, you must call your health care provider.  HOME CARE INSTRUCTIONS   Due to the slight chance that your pain could be angina, it is important to follow your health care provider's treatment plan and also maintain a healthy lifestyle:  · Maintain or work toward achieving a healthy weight.  · Stay physically active and exercise regularly.  · Decrease your salt intake.  · Eat a balanced, healthy diet. Talk to a dietitian to learn about heart-healthy foods.  · Increase your fiber intake by including whole grains, vegetables, fruits, and nuts in your diet.  · Avoid situations that cause stress, anger, or depression.  · Take medicines as advised by your health care provider. Report any side effects to your health care provider. Do not stop medicines or adjust the dosages on your own.  · Quit smoking. Do not use nicotine patches or gum until you check with your health care provider.  · Keep your blood pressure, blood sugar, and cholesterol levels within normal limits.  · Limit alcohol intake to no more than 1 drink per day for women who are not pregnant and 2 drinks per day for men.  · Do not abuse drugs.  SEEK IMMEDIATE MEDICAL CARE IF:  You have severe chest pain or pressure which may include symptoms such as:  · You feel pain or pressure in your arms, neck, jaw, or back.  · You have severe back or abdominal pain, feel sick to your stomach (nauseous), or throw up (vomit).  · You are sweating profusely.  · You are having a fast or irregular heartbeat.  · You feel short of breath while at rest.  · You notice increasing shortness of breath during rest, sleep, or with activity.  · You have chest pain that does not get better after rest or after taking your usual medicine.  · You wake from sleep with chest pain.  · You are unable to sleep because you  cannot breathe.  · You develop a frequent cough or you are coughing up blood.  · You feel dizzy, faint, or experience extreme fatigue.  · You develop severe weakness, dizziness, fainting, or chills.  Any of these symptoms may represent a serious problem that is an emergency. Do not wait to see if the symptoms will go away. Call your local emergency services (911 in the [place] Do not drive yourself to the hospital.  MAKE SURE YOU:  · Understand these instructions.  · Will watch your condition.  · Will get help right away if you are not doing well or get worse.     This information is not intended to replace advice given to you by your health care provider. Make sure you discuss any questions you have with your health care provider.     Document Released: 01/20/2012 Document Revised: 12/23/2014 Document Reviewed: 06/19/2014  [employer] Interactive Patient Education ©2016 [employer]    Sleep Studies  A sleep study (polysomnogram) is a series of tests done while you are sleeping. It can show how well you sleep. This can help your health care provider diagnose a sleep disorder and show how severe your sleep disorder is. A sleep study may lead to treatment that will help you sleep better and prevent other medical problems caused by poor sleep.  If you have a sleep disorder, you may also be at risk for:   · Sleep-related accidents.  · High blood pressure.  · Heart disease.  · Stroke.  · Other medical conditions.  Sleep disorders are common. Your health care provider may suspect a sleep disorder if you:  · Have loud snoring most nights.  · Have brief periods when you stop breathing at night.  · Feel sleepy on most days.  · Fall asleep suddenly during the day.  · Have trouble falling asleep or staying asleep.  · Feel like you need to move your legs when trying to fall asleep.  · Have dreams that seem very real shortly after falling asleep.  · Feel like you cannot move when you first wake up.  WHICH TESTS WILL I NEED TO  HAVE?   Most sleep studies last all night and include these tests:   · Recordings of your brain activity.  · Recordings of your eye movements.  · Recording of your heart rate and rhythm.  · Blood pressure readings.  · Readings of the amount of oxygen in your blood.  · Measurements of your chest and belly movement as you breathe during sleep.  If you have signs of the sleep disorder called sleep apnea during your test, you may get a mask to wear for the second half of the night.   · The mask provides continuous positive airway pressure (CPAP). This may improve sleep apnea significantly.  · You will then have all tests done again with the mask in place to see if your measurements and recordings change.  HOW ARE SLEEP STUDIES DONE?  Most sleep studies are done over one full night of sleep.   · You will arrive at the study center in the evening and can go home in the morning.  · Bring your pajamas and toothbrush.  · Do not have caffeine on the day of your sleep study.  · Your health care provider will let you know if you need to stop taking any of your regular medicines before the test.  To do the tests included in a polysomnogram, you will have:  · Round, sticky patches with sensors attached to recording wires (electrodes) placed on your scalp, face, chest, and limbs.  · Wires from all the electrodes and sensors run from your bed to a computer. The wires can be taken off and put back on if you need to get out of bed to go to the bathroom.  · A sensor placed over your nose to measure airflow.  · A finger clip put on one finger to measure your blood oxygen level.  · A belt around your belly and a belt around your chest to measure breathing movements.  WHERE ARE SLEEP STUDIES DONE?   Sleep studies are done at sleep centers. A sleep center may be inside a hospital, office, or clinic.   The room where you have the study may look like a hospital room or a hotel room. The health care providers doing the study may come in and  out of the room during the study. Most of the time, they will be in another room monitoring your test.   HOW IS INFORMATION FROM SLEEP STUDIES HELPFUL?  A polysomnogram can be used along with your medical history and a physical exam to diagnose conditions, such as:  · Sleep apnea.  · Restless legs syndrome.  · Sleep-related seizure disorders.  · Sleep-related movement disorders.  A medical doctor who specializes in sleep will evaluate your sleep study. The specialist will share the results with your primary health care provider. Treatments based on your sleep study may include:  · Improving your sleep habits (sleep hygiene).  · Wearing a CPAP mask.  · Wearing an oral device at night to improve breathing and reduce snoring.  · Taking medicine for:  ¨ Restless legs syndrome.  ¨ Sleep-related seizure disorder.  ¨ Sleep-related movement disorder.     This information is not intended to replace advice given to you by your health care provider. Make sure you discuss any questions you have with your health care provider.     Document Released: 06/23/2004 Document Revised: 01/08/2016 Document Reviewed: 02/23/2015  [employer] Interactive Patient Education ©2016 [employer]      Your appointments     May 03, 2017  7:00 AM   Adult Draw/Collection with [facility]   [facility] (--)    [address]  [place] [address]   [phone]            May 04, 2017  1:40 PM   HOSPITAL FOLLOW UP with Adelfo Mijares M.D.   [facility] (--)    [address]  [place] [id number] [phone]            May 10, 2017  3:00 PM   Established Patient with Aaron Chan M.D.   [facility]    [address]  [address]  [place] [id number] [phone]           You will be receiving a confirmation call a few days before your appointment from our automated call confirmation system.              Follow-up  Information     1. Follow up with Adelfo Mijares M.D.. Call in 1 day.    Specialty:  Interventional Cardiology    Why:  to make follow-up appointment for further medication mgmt    Contact information    [address]  [address]  [place] [address] [phone]          2. Follow up with Aaron Chan M.D.. Go on 5/10/2017.    Specialty:  Family Medicine    Why:  Let them know about recent hospitalization and that it was recommended that you obtain a prostate screening exam    Contact information    [address]  [address]  [place] [address] [phone]           Discharge Medication Instructions:    Below are the medications your physician expects you to take upon discharge:    Review all your home medications and newly ordered medications with your doctor and/or pharmacist. Follow medication instructions as directed by your doctor and/or pharmacist.    Please keep your medication list with you and share with your physician.               Medication List      START taking these medications        Instructions    Morning Afternoon Evening Bedtime    amlodipine 10 MG Tabs   Commonly known as:  NORVASC        Doctor's comments:  Please call your Cardiologist if you do not tolerate this medication.   Take 1 Tab by mouth every day.   Dose:  10 mg                        doxazosin 2 MG Tabs   Last time this was given:  2 mg on 5/1/2017 11:38 PM   Commonly known as:  CARDURA        Doctor's comments:  Hold for Systolic Blood pressure (top number) < 120   Take 1 Tab by mouth every bedtime.   Dose:  2 mg                        hydrALAZINE 50 MG Tabs   Last time this was given:  50 mg on 5/2/2017  6:12 AM   Commonly known as:  APRESOLINE        Take 1 Tab by mouth 3 times a day.   Dose:  50 mg                          CONTINUE taking these medications        Instructions    Morning Afternoon Evening Bedtime    cetirizine 10 MG Tabs   Last time this was given:  10 mg on 5/2/2017  8:44 AM   Commonly known as:   ZYRTEC        Take 10 mg by mouth every day.   Dose:  10 mg                        diazepam 10 MG tablet   Last time this was given:  10 mg on 5/2/2017  9:07 AM   Commonly known as:  VALIUM        Take 10 mg by mouth 2 times a day as needed for Anxiety.   Dose:  10 mg                        furosemide 40 MG Tabs   Commonly known as:  LASIX        Take 1 Tab by mouth every day.   Dose:  40 mg                        hydrocodone/acetaminophen  MG Tabs   Last time this was given:  1 Tab on 5/2/2017  8:47 AM   Commonly known as:  NORCO        Take 1-2 Tabs by mouth every 4 hours.   Dose:  1-2 Tab                        lisinopril 40 MG tablet   Last time this was given:  40 mg on 5/2/2017  8:45 AM   Commonly known as:  PRINIVIL, ZESTRIL        Take 40 mg by mouth 2 times a day.   Dose:  40 mg                        metoprolol 50 MG Tabs   Last time this was given:  50 mg on 5/2/2017  8:44 AM   Commonly known as:  LOPRESSOR        Take 1 Tab by mouth 2 times a day.   Dose:  50 mg                        oxycodone 30 MG immediate release tablet   Commonly known as:  OXY-IR        Doctor's comments:  Last refill.  Must establish with physiatry.   Take 1 Tab by mouth every four hours as needed for Moderate Pain.   Dose:  30 mg                             Where to Get Your Medications      Information about where to get these medications is not yet available     ! Ask your nurse or doctor about these medications    - amlodipine 10 MG Tabs  - doxazosin 2 MG Tabs  - hydrALAZINE 50 MG Tabs            Instructions           Diet / Nutrition:    Follow any diet instructions given to you by your doctor or the dietician, including how much salt (sodium) you are allowed each day.    If you are overweight, talk to your doctor about a weight reduction plan.    Activity:    Remain physically active following your doctor's instructions about exercise and activity.    Rest often.     Any time you become even a little tired or short  of breath, SIT DOWN and rest.    Worsening Symptoms:    Report any of the following signs and symptoms to the doctor's office immediately:    *Pain of jaw, arm, or neck  *Chest pain not relieved by medication                               *Dizziness or loss of consciousness  *Difficulty breathing even when at rest   *More tired than usual                                       *Bleeding drainage or swelling of surgical site  *Swelling of feet, ankles, legs or stomach                 *Fever (>100ºF)  *Pink or blood tinged sputum  *Weight gain (3lbs/day or 5lbs /week)           *Shock from internal defibrillator (if applicable)  *Palpitations or irregular heartbeats                *Cool and/or numb extremities    Stroke Awareness    Common Risk Factors for Stroke include:    Age  Atrial Fibrillation  Carotid Artery Stenosis  Diabetes Mellitus  Excessive alcohol consumption  High blood pressure  Overweight   Physical inactivity  Smoking    Warning signs and symptoms of a stroke include:    *Sudden numbness or weakness of the face, arm or leg (especially on one side of the body).  *Sudden confusion, trouble speaking or understanding.  *Sudden trouble seeing in one or both eyes.  *Sudden trouble walking, dizziness, loss of balance or coordination.Sudden severe headache with no known cause.    It is very important to get treatment quickly when a stroke occurs. If you experience any of the above warning signs, call 911 immediately.                   Disclaimer         Quit Smoking / Tobacco Use:    I understand the use of any tobacco products increases my chance of suffering from future heart disease or stroke and could cause other illnesses which may shorten my life. Quitting the use of tobacco products is the single most important thing I can do to improve my health. For further information on smoking / tobacco cessation call a Toll Free Quit Line at 1-383.614.6378 (*National Cancer [place]) or 1-214.858.4444 (American  Lung Association) or you can access the web based program at www.lungusa.org.    [place] Tobacco Users Help Line:  (593) 581-7601       Toll Free: 1-864.773.7042  Quit Tobacco Program [facility] (023)405-6927    Crisis Hotline:    [place] Crisis Hotline:  0-083-CSLKWYF or 1-333.728.2646    [place] Crisis Hotline:    1-189.917.7921 or 320-692-2226    Discharge Survey:   Thank you for choosing [facility]. We hope we did everything we could to make your hospital stay a pleasant one. You may be receiving a phone survey and we would appreciate your time and participation in answering the questions. Your input is very valuable to us in our efforts to improve our service to our patients and their families.        My signature on this form indicates that:    1. I have reviewed and understand the above information.  2. My questions regarding this information have been answered to my satisfaction.  3. I have formulated a plan with my discharge nurse to obtain my prescribed medications for home.                  Disclaimer         __________________________________                     __________       ________                       Patient Signature                                                 Date                    Time

## 2017-05-02 VITALS
TEMPERATURE: 98 F | HEART RATE: 77 BPM | SYSTOLIC BLOOD PRESSURE: 127 MMHG | OXYGEN SATURATION: 93 % | WEIGHT: 297.62 LBS | DIASTOLIC BLOOD PRESSURE: 60 MMHG | RESPIRATION RATE: 18 BRPM | HEIGHT: 71 IN | BODY MASS INDEX: 41.67 KG/M2

## 2017-05-02 PROBLEM — R07.9 CHEST PAIN IN ADULT: Status: RESOLVED | Noted: 2017-05-01 | Resolved: 2017-05-02

## 2017-05-02 LAB
ALBUMIN SERPL BCP-MCNC: 4.2 G/DL (ref 3.2–4.9)
ALBUMIN/GLOB SERPL: 1.5 G/DL
ALP SERPL-CCNC: 50 U/L (ref 30–99)
ALT SERPL-CCNC: 47 U/L (ref 2–50)
ANION GAP SERPL CALC-SCNC: 13 MMOL/L (ref 0–11.9)
APPEARANCE UR: CLEAR
AST SERPL-CCNC: 24 U/L (ref 12–45)
BILIRUB SERPL-MCNC: 1.1 MG/DL (ref 0.1–1.5)
BILIRUB UR QL STRIP.AUTO: NEGATIVE
BUN SERPL-MCNC: 27 MG/DL (ref 8–22)
CALCIUM SERPL-MCNC: 8.7 MG/DL (ref 8.5–10.5)
CHLORIDE SERPL-SCNC: 102 MMOL/L (ref 96–112)
CHOLEST SERPL-MCNC: 121 MG/DL (ref 100–199)
CO2 SERPL-SCNC: 21 MMOL/L (ref 20–33)
COLOR UR: YELLOW
CREAT SERPL-MCNC: 1.2 MG/DL (ref 0.5–1.4)
CULTURE IF INDICATED INDCX: NO UA CULTURE
EKG IMPRESSION: NORMAL
GFR SERPL CREATININE-BSD FRML MDRD: >60 ML/MIN/1.73 M 2
GLOBULIN SER CALC-MCNC: 2.8 G/DL (ref 1.9–3.5)
GLUCOSE SERPL-MCNC: 134 MG/DL (ref 65–99)
GLUCOSE UR STRIP.AUTO-MCNC: NEGATIVE MG/DL
HDLC SERPL-MCNC: 22 MG/DL
KETONES UR STRIP.AUTO-MCNC: NEGATIVE MG/DL
LACTATE BLD-SCNC: 1.7 MMOL/L (ref 0.5–2)
LACTATE BLD-SCNC: 2.7 MMOL/L (ref 0.5–2)
LDLC SERPL CALC-MCNC: 48 MG/DL
LEUKOCYTE ESTERASE UR QL STRIP.AUTO: NEGATIVE
MICRO URNS: NORMAL
NITRITE UR QL STRIP.AUTO: NEGATIVE
PH UR STRIP.AUTO: 5 [PH]
POTASSIUM SERPL-SCNC: 3.5 MMOL/L (ref 3.6–5.5)
PROT SERPL-MCNC: 7 G/DL (ref 6–8.2)
PROT UR QL STRIP: NEGATIVE MG/DL
RBC UR QL AUTO: NEGATIVE
SODIUM SERPL-SCNC: 136 MMOL/L (ref 135–145)
SP GR UR STRIP.AUTO: 1.03
TRIGL SERPL-MCNC: 253 MG/DL (ref 0–149)
TROPONIN I SERPL-MCNC: 0.02 NG/ML (ref 0–0.04)
TROPONIN I SERPL-MCNC: 0.02 NG/ML (ref 0–0.04)

## 2017-05-02 PROCEDURE — 93005 ELECTROCARDIOGRAM TRACING: CPT | Performed by: HOSPITALIST

## 2017-05-02 PROCEDURE — 96372 THER/PROPH/DIAG INJ SC/IM: CPT

## 2017-05-02 PROCEDURE — 99217 PR OBSERVATION CARE DISCHARGE: CPT | Performed by: HOSPITALIST

## 2017-05-02 PROCEDURE — 700111 HCHG RX REV CODE 636 W/ 250 OVERRIDE (IP): Performed by: HOSPITALIST

## 2017-05-02 PROCEDURE — 700102 HCHG RX REV CODE 250 W/ 637 OVERRIDE(OP): Performed by: INTERNAL MEDICINE

## 2017-05-02 PROCEDURE — 80061 LIPID PANEL: CPT

## 2017-05-02 PROCEDURE — 93010 ELECTROCARDIOGRAM REPORT: CPT | Performed by: INTERNAL MEDICINE

## 2017-05-02 PROCEDURE — 80053 COMPREHEN METABOLIC PANEL: CPT

## 2017-05-02 PROCEDURE — 700111 HCHG RX REV CODE 636 W/ 250 OVERRIDE (IP): Performed by: INTERNAL MEDICINE

## 2017-05-02 PROCEDURE — 96376 TX/PRO/DX INJ SAME DRUG ADON: CPT

## 2017-05-02 PROCEDURE — 84484 ASSAY OF TROPONIN QUANT: CPT

## 2017-05-02 PROCEDURE — 81003 URINALYSIS AUTO W/O SCOPE: CPT

## 2017-05-02 PROCEDURE — 96361 HYDRATE IV INFUSION ADD-ON: CPT

## 2017-05-02 PROCEDURE — A9270 NON-COVERED ITEM OR SERVICE: HCPCS | Performed by: HOSPITALIST

## 2017-05-02 PROCEDURE — A9270 NON-COVERED ITEM OR SERVICE: HCPCS | Performed by: INTERNAL MEDICINE

## 2017-05-02 PROCEDURE — 83605 ASSAY OF LACTIC ACID: CPT

## 2017-05-02 PROCEDURE — 700102 HCHG RX REV CODE 250 W/ 637 OVERRIDE(OP): Performed by: HOSPITALIST

## 2017-05-02 PROCEDURE — 700105 HCHG RX REV CODE 258: Performed by: NURSE PRACTITIONER

## 2017-05-02 PROCEDURE — 36415 COLL VENOUS BLD VENIPUNCTURE: CPT

## 2017-05-02 PROCEDURE — G0378 HOSPITAL OBSERVATION PER HR: HCPCS

## 2017-05-02 RX ORDER — AMLODIPINE BESYLATE 10 MG/1
10 TABLET ORAL DAILY
Qty: 30 TAB | Refills: 0 | Status: SHIPPED | OUTPATIENT
Start: 2017-05-02 | End: 2017-05-08

## 2017-05-02 RX ORDER — HYDRALAZINE HYDROCHLORIDE 50 MG/1
50 TABLET, FILM COATED ORAL 3 TIMES DAILY
Qty: 90 TAB | Refills: 0 | Status: SHIPPED | OUTPATIENT
Start: 2017-05-02 | End: 2017-05-18 | Stop reason: SDUPTHER

## 2017-05-02 RX ORDER — SODIUM CHLORIDE 9 MG/ML
INJECTION, SOLUTION INTRAVENOUS ONCE
Status: COMPLETED | OUTPATIENT
Start: 2017-05-02 | End: 2017-05-02

## 2017-05-02 RX ORDER — DOXAZOSIN 2 MG/1
2 TABLET ORAL
Qty: 30 TAB | Refills: 0 | Status: SHIPPED | OUTPATIENT
Start: 2017-05-02 | End: 2017-05-18 | Stop reason: SDUPTHER

## 2017-05-02 RX ADMIN — HYDROCODONE BITARTRATE AND ACETAMINOPHEN 1 TABLET: 10; 325 TABLET ORAL at 08:47

## 2017-05-02 RX ADMIN — FUROSEMIDE 20 MG: 10 INJECTION, SOLUTION INTRAMUSCULAR; INTRAVENOUS at 08:46

## 2017-05-02 RX ADMIN — HEPARIN SODIUM 5000 UNITS: 5000 INJECTION, SOLUTION INTRAVENOUS; SUBCUTANEOUS at 06:12

## 2017-05-02 RX ADMIN — LISINOPRIL 40 MG: 20 TABLET ORAL at 08:45

## 2017-05-02 RX ADMIN — SODIUM CHLORIDE: 9 INJECTION, SOLUTION INTRAVENOUS at 04:21

## 2017-05-02 RX ADMIN — DIAZEPAM 10 MG: 5 TABLET ORAL at 09:07

## 2017-05-02 RX ADMIN — HYDROCODONE BITARTRATE AND ACETAMINOPHEN 1 TABLET: 10; 325 TABLET ORAL at 03:05

## 2017-05-02 RX ADMIN — METOPROLOL TARTRATE 50 MG: 25 TABLET, FILM COATED ORAL at 08:44

## 2017-05-02 RX ADMIN — HYDRALAZINE HYDROCHLORIDE 50 MG: 50 TABLET ORAL at 06:12

## 2017-05-02 RX ADMIN — CETIRIZINE HYDROCHLORIDE 10 MG: 10 TABLET, FILM COATED ORAL at 08:44

## 2017-05-02 ASSESSMENT — PAIN SCALES - WONG BAKER: WONGBAKER_NUMERICALRESPONSE: DOESN'T HURT AT ALL

## 2017-05-02 ASSESSMENT — PAIN SCALES - GENERAL
PAINLEVEL_OUTOF10: 0

## 2017-05-02 NOTE — CONSULTS
DATE OF SERVICE:  05/01/2017    DATE OF CONSULTATION:  05/01/2017    REQUESTING PHYSICIAN:  Angela Mendoza MD, Hospitalist.    REASON FOR CONSULTATION:  Shortness of breath and uncontrolled hypertension.    HISTORY OF PRESENT ILLNESS:  The patient is a 47-year-old  male with   chronic hypertension since his early adulthood, morbid obesity with   polycythemia and chronic back pain, who presented to the emergency room with   chief complaint of shortness of breath this morning.    The patient has been in the 260 pounds range at least for the last 4 years.  He was   admitted to the hospital here last December for uncontrolled hypertension.  At that time he was taking atenolol, lisinopril, and furosemide.  The medications   were changed to labetalol, metolazone, and minoxidil.  He was also advised to   continue lisinopril.    The patient stated the minoxidil made him feel weird but difficult to   describe, so he decided to discontinue taking it like about a month following   that.  He was having difficulty getting an appointment with his primary care   provider.  He was not seen by any physician from December until last week.    When he was seen by his new primary care provider in the office.  According to the record, blood pressure in the office was markedly elevated at 210.    Dr. Chan, his new primary care provider started him on metoprolol 50 mg twice   a day and advised him to stop minoxidil and labetalol.  It appeared that Dr. Chan did not know that the patient was already taking lisinopril.  He was advised   him to stop taking 20 mg twice a day.  He was also stopped his metolazone and   started him on furosemide 40 mg daily.    As mentioned above, this morning at work, he became significantly dyspneic and   decided to seek medical attention in emergency room.    Additionally, the patient stated he has not been able to sleep, then he was   taking metolazone, which makes him get up to go to the bathroom  many times at   night, although often time only small amount of urine would come out.    He denied any high salt or fluid intake.  He stated he drinks a couple glasses   of wine 3 times a week.  He has chronic back pain.  He has been under pain   management and stated under control on chronic narcotic therapy.    He was advised to have a sleep study 4 years ago where he has not done so.    He had echocardiography done last December when he was hospitalized here.   It reportedly show moderate concentric left ventricular hypertrophy with   ejection fraction of 55% along with mild mitral regurgitation and grade II   diastolic dysfunction.      At that time, he also underwent stress myocardial perfusion imaging, which did not  show any evidence of ischemia.    ALLERGIES:  HE IS REPORTEDLY ALLERGIC TO NAPROXEN, OLMESARTAN,   HYDROCHLOROTHIAZIDE, AMLODIPINE COMBINATION DESCRIBED AS BLURRED VISION AND   VERTIGO.    PAST MEDICAL HISTORY:  In addition to above-mentioned problems, denied prior   diabetes mellitus.  Positive for history of back pain.  No prior stroke or   heart attack.  He again stated he was told he has high blood pressures since   he was in his early 20s.  He believes he underwent multiple testings.  At one   point, he was told it may be from his kidney, but renal ultrasound from a few   years ago show normal size kidney.  His CMP over the years did not show any   hypokalemia.  As mentioned above, remarkable for polycythemia.  His hemoglobin   in December of last year was 19.6.    PAST SURGICAL HISTORY:  Positive for left hip surgery, eye surgery due to   injury, and tonsillectomy.    SOCIAL HISTORY:  Used to smoke as much as 2-1/2 packs a day for 26 years, quit   smoking in last December.  He used to drink some alcohol as mentioned above.    He denied drug use, but has been on chronic pain medication for back pain.    FAMILY HISTORY:  Mother has rheumatoid arthritis and asthma.  Father    from colon  cancer.  Denies any family history of premature coronary artery   disease or sudden death.  He stated that several family members with   hypertension relatively young age.    REVIEW OF SYSTEMS:  Positive for back pain and shortness of breath.  He stated   he walked a lot at work.  He stated he has gained over 50 sounds since   December.  All other review of systems is negative.    PHYSICAL EXAMINATION:  GENERAL:  Reveals a 47-year-old morbidly obese, not in acute distress, not   dyspneic with sitting up with about 60-degree head elevation, alert and   oriented x3.  VITAL SIGNS:  Blood pressure 170/96, heart rate 104, respirations 20, and   temperature 37.3.  HEENT:  Head, atraumatic and normocephalic.  NECK:  Supple.  JVD difficult to appreciate due to body habitus.  No   thyromegaly or lymphadenopathy.  LUNGS:  Decreased breath sounds.  No rales or wheezing.  HEART:  Distant sounds, slightly tachycardic.  Occasional ectopy.  No murmur,   rub or gallop.  ABDOMEN:  Obese, nontender, no mass.  EXTREMITIES:  No clubbing, cyanosis or edema.  NEUROLOGIC:  Grossly intact.  SKIN:  No ecchymosis or petechiae.    LABORATORY DATA:  Electrocardiogram this afternoon by my review shows   ventricular bigeminy with some nonspecific inferolateral ST changes.  The PVCs   is left bundle-branch block with inferior axis pattern.    Additional laboratory, CBC:  Hemoglobin 17.6 and platelet 126.  CMP:  BUN 27   and creatinine 1.25.  Brain natriuretic peptide 134.  Troponin 0.02.    Potassium 4.    IMPRESSION:  1.  Uncontrolled hypertension, likely in part due to untreated sleep apnea   and insomnia perhaps because of nocturia and some back pain.  He also has been   recent change in his medication with perhaps some misunderstanding by his primary   care provider in terms of what he is normally taking.  I believe that he   would benefit from calcium channel blocker such as amlodipine.  It listed site   of allergy, although he was taking  triple therapy combination.  He has been   started on hydralazine here.  We could try that for now, but I would prefer   longacting amlodipine in the long run.  He also appears to have some symptoms   to indicate benign prostatic hypertrophy.  Therefore, he may benefit from   alpha blockers such as doxazosin.  2.  Polycythemia, probably from untreated sleep apnea.  3.  Morbid obesity.  4.  Frequent premature ventricular contraction, left bundle-branch block morphology.  5.  Chronic back pain.  6.  Probably benign prostatic hypertrophy.  7.  Probably sleep apnea as mentioned above.    RECOMMENDATIONS:  Agree with continuing metoprolol for now, although the   patient believed that atenolol worked better for him.  Probably, he does not   need 80 mg furosemide.  I would cut that down to 20 mg twice a day. I did   not feel that he needs nitrates.  We would start alpha blocker and consider starting   him on amlodipine as mentioned above.  We will follow the patient along with   you.  He has relatively recent echo and myocardial perfusion imaging.    Therefore, there is no need to repeat those tests at this time.  We will   follow patient along with you.    Thank you for allowing us to participate in the care of this patient.       ____________________________________     MD SAUL RIDDLE / ERIN    DD:  05/01/2017 23:00:22  DT:  05/02/2017 00:29:30    D#:  [id number]  Job#:  [id number]

## 2017-05-02 NOTE — DISCHARGE INSTRUCTIONS
Discharge Instructions    Discharged to home by car with relative. Discharged via walking, hospital escort: Yes.  Special equipment needed: Not Applicable    Be sure to schedule a follow-up appointment with your primary care doctor or any specialists as instructed.     Discharge Plan:   Diet Plan: Discussed  Activity Level: Discussed  Confirmed Follow up Appointment: Patient to Call and Schedule Appointment  Confirmed Symptoms Management: Discussed  Medication Reconciliation Updated: Yes  Influenza Vaccine Indication: Patient Refuses    I understand that a diet low in cholesterol, fat, and sodium is recommended for good health. Unless I have been given specific instructions below for another diet, I accept this instruction as my diet prescription.   Other diet: Heart Healthy    Special Instructions: Follow up with primary care physician, sleep study and cardiology.  Activity as tolerated.  · Is patient discharged on Warfarin / Coumadin?   No     · Is patient Post Blood Transfusion?  No    Depression / Suicide Risk    As you are discharged from this [facility] facility, it is important to learn how to keep safe from harming yourself.    Recognize the warning signs:  · Abrupt changes in personality, positive or negative- including increase in energy   · Giving away possessions  · Change in eating patterns- significant weight changes-  positive or negative  · Change in sleeping patterns- unable to sleep or sleeping all the time   · Unwillingness or inability to communicate  · Depression  · Unusual sadness, discouragement and loneliness  · Talk of wanting to die  · Neglect of personal appearance   · Rebelliousness- reckless behavior  · Withdrawal from people/activities they love  · Confusion- inability to concentrate     If you or a loved one observes any of these behaviors or has concerns about self-harm, here's what you can do:  · Talk about it- your feelings and reasons for harming yourself  · Remove any means  that you might use to hurt yourself (examples: pills, rope, extension cords, firearm)  · Get professional help from the community (Mental Health, Substance Abuse, psychological counseling)  · Do not be alone:Call your Safe Contact- someone whom you trust who will be there for you.  · Call your local CRISIS HOTLINE 647-1839 or 471-005-7953  · Call your local Children's Mobile Crisis Response Team [place] (304) 398-7575 or wwwadFreeq  · Call the toll free National Suicide Prevention Hotlines   · National Suicide Prevention Lifeline 436-959-NYRZ (1447)  · [employer] Hope Line Network 800-SUICIDE (146-0454)  Chest Pain Observation  It is often hard to give a specific diagnosis for the cause of chest pain. Among other possibilities your symptoms might be caused by inadequate oxygen delivery to your heart (angina). Angina that is not treated or evaluated can lead to a heart attack (myocardial infarction) or death.  Blood tests, electrocardiograms, and X-rays may have been done to help determine a possible cause of your chest pain. After evaluation and observation, your health care provider has determined that it is unlikely your pain was caused by an unstable condition that requires hospitalization. However, a full evaluation of your pain may need to be completed, with additional diagnostic testing as directed. It is very important to keep your follow-up appointments. Not keeping your follow-up appointments could result in permanent heart damage, disability, or death. If there is any problem keeping your follow-up appointments, you must call your health care provider.  HOME CARE INSTRUCTIONS   Due to the slight chance that your pain could be angina, it is important to follow your health care provider's treatment plan and also maintain a healthy lifestyle:  · Maintain or work toward achieving a healthy weight.  · Stay physically active and exercise regularly.  · Decrease your salt intake.  · Eat a balanced,  healthy diet. Talk to a dietitian to learn about heart-healthy foods.  · Increase your fiber intake by including whole grains, vegetables, fruits, and nuts in your diet.  · Avoid situations that cause stress, anger, or depression.  · Take medicines as advised by your health care provider. Report any side effects to your health care provider. Do not stop medicines or adjust the dosages on your own.  · Quit smoking. Do not use nicotine patches or gum until you check with your health care provider.  · Keep your blood pressure, blood sugar, and cholesterol levels within normal limits.  · Limit alcohol intake to no more than 1 drink per day for women who are not pregnant and 2 drinks per day for men.  · Do not abuse drugs.  SEEK IMMEDIATE MEDICAL CARE IF:  You have severe chest pain or pressure which may include symptoms such as:  · You feel pain or pressure in your arms, neck, jaw, or back.  · You have severe back or abdominal pain, feel sick to your stomach (nauseous), or throw up (vomit).  · You are sweating profusely.  · You are having a fast or irregular heartbeat.  · You feel short of breath while at rest.  · You notice increasing shortness of breath during rest, sleep, or with activity.  · You have chest pain that does not get better after rest or after taking your usual medicine.  · You wake from sleep with chest pain.  · You are unable to sleep because you cannot breathe.  · You develop a frequent cough or you are coughing up blood.  · You feel dizzy, faint, or experience extreme fatigue.  · You develop severe weakness, dizziness, fainting, or chills.  Any of these symptoms may represent a serious problem that is an emergency. Do not wait to see if the symptoms will go away. Call your local emergency services (911 in the [place] Do not drive yourself to the hospital.  MAKE SURE YOU:  · Understand these instructions.  · Will watch your condition.  · Will get help right away if you are not doing well or get  worse.     This information is not intended to replace advice given to you by your health care provider. Make sure you discuss any questions you have with your health care provider.     Document Released: 01/20/2012 Document Revised: 12/23/2014 Document Reviewed: 06/19/2014  [employer] Interactive Patient Education ©2016 [employer]    Sleep Studies  A sleep study (polysomnogram) is a series of tests done while you are sleeping. It can show how well you sleep. This can help your health care provider diagnose a sleep disorder and show how severe your sleep disorder is. A sleep study may lead to treatment that will help you sleep better and prevent other medical problems caused by poor sleep.  If you have a sleep disorder, you may also be at risk for:   · Sleep-related accidents.  · High blood pressure.  · Heart disease.  · Stroke.  · Other medical conditions.  Sleep disorders are common. Your health care provider may suspect a sleep disorder if you:  · Have loud snoring most nights.  · Have brief periods when you stop breathing at night.  · Feel sleepy on most days.  · Fall asleep suddenly during the day.  · Have trouble falling asleep or staying asleep.  · Feel like you need to move your legs when trying to fall asleep.  · Have dreams that seem very real shortly after falling asleep.  · Feel like you cannot move when you first wake up.  WHICH TESTS WILL I NEED TO HAVE?   Most sleep studies last all night and include these tests:   · Recordings of your brain activity.  · Recordings of your eye movements.  · Recording of your heart rate and rhythm.  · Blood pressure readings.  · Readings of the amount of oxygen in your blood.  · Measurements of your chest and belly movement as you breathe during sleep.  If you have signs of the sleep disorder called sleep apnea during your test, you may get a mask to wear for the second half of the night.   · The mask provides continuous positive airway pressure (CPAP). This may  improve sleep apnea significantly.  · You will then have all tests done again with the mask in place to see if your measurements and recordings change.  HOW ARE SLEEP STUDIES DONE?  Most sleep studies are done over one full night of sleep.   · You will arrive at the study center in the evening and can go home in the morning.  · Bring your pajamas and toothbrush.  · Do not have caffeine on the day of your sleep study.  · Your health care provider will let you know if you need to stop taking any of your regular medicines before the test.  To do the tests included in a polysomnogram, you will have:  · Round, sticky patches with sensors attached to recording wires (electrodes) placed on your scalp, face, chest, and limbs.  · Wires from all the electrodes and sensors run from your bed to a computer. The wires can be taken off and put back on if you need to get out of bed to go to the bathroom.  · A sensor placed over your nose to measure airflow.  · A finger clip put on one finger to measure your blood oxygen level.  · A belt around your belly and a belt around your chest to measure breathing movements.  WHERE ARE SLEEP STUDIES DONE?   Sleep studies are done at sleep centers. A sleep center may be inside a hospital, office, or clinic.   The room where you have the study may look like a hospital room or a hotel room. The health care providers doing the study may come in and out of the room during the study. Most of the time, they will be in another room monitoring your test.   HOW IS INFORMATION FROM SLEEP STUDIES HELPFUL?  A polysomnogram can be used along with your medical history and a physical exam to diagnose conditions, such as:  · Sleep apnea.  · Restless legs syndrome.  · Sleep-related seizure disorders.  · Sleep-related movement disorders.  A medical doctor who specializes in sleep will evaluate your sleep study. The specialist will share the results with your primary health care provider. Treatments based on  your sleep study may include:  · Improving your sleep habits (sleep hygiene).  · Wearing a CPAP mask.  · Wearing an oral device at night to improve breathing and reduce snoring.  · Taking medicine for:  ¨ Restless legs syndrome.  ¨ Sleep-related seizure disorder.  ¨ Sleep-related movement disorder.     This information is not intended to replace advice given to you by your health care provider. Make sure you discuss any questions you have with your health care provider.     Document Released: 06/23/2004 Document Revised: 01/08/2016 Document Reviewed: 02/23/2015  [employer] Interactive Patient Education ©2016 [employer]

## 2017-05-02 NOTE — ED NOTES
Pt c/o sob, speaking in complete sentences; c/o nausea, bilat arm pain, lightheadedness. Reports hx of copd, emphysema

## 2017-05-02 NOTE — RESPIRATORY CARE
COPD EDUCATION by COPD CLINICAL EDUCATOR  5/2/2017 at 10:32 AM by Adelina Taylor     Patient reviewed by COPD education team. Patient does not qualify for COPD program.
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
## 2017-05-02 NOTE — DISCHARGE PLANNING
"Patient talkative and friendly during assessment. \"I'm a . My doctor is Chinese, and he's really into the vegan stuff. We have interesting discussions!\"      Care Transition Team Assessment    Information Source  Orientation : Oriented x 4  Information Given By: Patient  Who is responsible for making decisions for patient? : Patient    Readmission Evaluation  Is this a readmission?: No    Elopement Risk  Legal Hold: No  Ambulatory or Self Mobile in Wheelchair: No-Not an Elopement Risk  Elopement Risk: Not at Risk for Elopement    Interdisciplinary Discharge Planning  Does Admitting Nurse Feel This Could be a Complex Discharge?: No  Primary Care Physician: Aaron Chan M.D.  Lives with - Patient's Self Care Capacity: Spouse  Support Systems: Children, Family Member(s)  Housing / Facility: [address] (\"I have a 45 acre ranch.\")  Do You Take your Prescribed Medications Regularly: Yes (Uses [facility] pharmacy in [place].)  Able to Return to Previous ADL's: Yes  Mobility Issues: No  Prior Services: None  Patient Expects to be Discharged to:: Home.  Assistance Needed: No  Durable Medical Equipment: Not Applicable    Discharge Preparedness  What is your plan after discharge?:  (Home.)  What are your discharge supports?: Child, Spouse  Prior Functional Level: Ambulatory, Drives Self, Independent with Activities of Daily Living  Difficulity with ADLs: None  Difficulity with IADLs: None    Functional Assesment  Prior Functional Level: Ambulatory, Drives Self, Independent with Activities of Daily Living    Finances  Financial Barriers to Discharge: No (Wife employed. Pt is self-employed (carpet business).)  Prescription Coverage: Yes (Through wife's insurance.)    Vision / Hearing Impairment  Vision Impairment : Yes  Right Eye Vision: Wears Glasses ([place].)  Left Eye Vision: Wears Glasses ([place].)  Hearing Impairment : No    Values / Beliefs / Concerns  Values / Beliefs Concerns : No    Advance " Directive  Advance Directive?: None  Advance Directive offered?: AD Booklet given    Domestic Abuse  Have you ever been the victim of abuse or violence?: No  Physical Abuse or Sexual Abuse: No  Verbal Abuse or Emotional Abuse: No  Possible Abuse Reported to:: Not Applicable    Psychological Assessment  History of Substance Abuse: None  History of Psychiatric Problems: No  Non-compliant with Treatment: No  Newly Diagnosed Illness: No    Discharge Risks or Barriers  Discharge risks or barriers?: No    Anticipated Discharge Information  Anticipated discharge disposition: Home  Discharge Address: [address] [place] [address]  Discharge Contact Phone Number: [phone] (home)

## 2017-05-02 NOTE — PROGRESS NOTES
"Interval History:  47M with refractory HTN, obesity and probable ADRIANNA presents with hypertensive emergency with BPs 270/170's. Now improved with medical therapy.  5/1: CTA shows stable ascending aortic aneurysm, no disseciton. Ruled out for MI. BP improved.    Physical Exam   Blood pressure 131/61, pulse 82, temperature 36.3 °C (97.3 °F), temperature source Temporal, resp. rate 18, height 1.803 m (5' 11\"), weight 135 kg (297 lb 9.9 oz), SpO2 94 %.    Constitutional: Appears well-developed. Obese. Big Neck.  HENT: Normocephalic and atraumatic. No scleral icterus.   Neck: No JVD present.   Cardiovascular: Normal rate. Exam reveals no gallop and no friction rub. No murmur heard.   Pulmonary/Chest: CTAB    Abdominal: S/NT/ND BS+   Musculoskeletal: Exhibits no edema. Pulses present.  Skin: Skin is warm and dry.     ROS: As HPI other reviewed and negative     No intake or output data in the 24 hours ending 05/02/17 0824    Recent Labs      05/01/17 1718   WBC  11.7*   RBC  5.43   HEMOGLOBIN  17.6   HEMATOCRIT  48.8   MCV  89.9   MCH  32.4   MCHC  36.1*   RDW  42.5   PLATELETCT  126*   MPV  11.3     Recent Labs      05/01/17 1718 05/02/17   0221   SODIUM  136  136   POTASSIUM  4.0  3.5*   CHLORIDE  102  102   CO2  24  21   GLUCOSE  109*  134*   BUN  27*  27*   CREATININE  1.25  1.20   CALCIUM  9.0  8.7         Recent Labs      05/01/17 1718   BNPBTYPENAT  134*     Recent Labs      05/01/17   1718  05/01/17   2208  05/02/17   0221  05/02/17   0400   TROPONINI  0.02  0.02  0.02  0.02   BNPBTYPENAT  134*   --    --    --      Recent Labs      05/02/17 0221   TRIGLYCERIDE  253*   HDL  22*   LDL  48       No current facility-administered medications on file prior to encounter.     Current Outpatient Prescriptions on File Prior to Encounter   Medication Sig Dispense Refill   • metoprolol (LOPRESSOR) 50 MG Tab Take 1 Tab by mouth 2 times a day. 90 Tab 0   • furosemide (LASIX) 40 MG Tab Take 1 Tab by mouth every day. 30 " Tab 0   • hydrocodone/acetaminophen (NORCO)  MG Tab Take 1-2 Tabs by mouth every 4 hours.     • diazepam (VALIUM) 10 MG tablet Take 10 mg by mouth 2 times a day as needed for Anxiety.     • oxycodone (OXY-IR) 30 MG immediate release tablet Take 1 Tab by mouth every four hours as needed for Moderate Pain. 120 Tab 0   • cetirizine (ZYRTEC) 10 MG TABS Take 10 mg by mouth every day.         Current Facility-Administered Medications   Medication Dose Frequency Provider Last Rate Last Dose   • cetirizine (ZYRTEC) tablet 10 mg  10 mg DAILY Angela Mendoza M.D.       • diazepam (VALIUM) tablet 10 mg  10 mg BID PRN Angela Mendoza M.D.       • lisinopril (PRINIVIL) tablet 40 mg  40 mg BID Angela Mendoza M.D.   40 mg at 05/01/17 2036   • oxycodone immediate-release (ROXICODONE) tablet 30 mg  30 mg Q6HRS PRN Angela Mendoza M.D.   30 mg at 05/01/17 2039   • senna-docusate (PERICOLACE or SENOKOT S) 8.6-50 MG per tablet 2 Tab  2 Tab BID Angela Mendoza M.D.   2 Tab at 05/01/17 2100    And   • polyethylene glycol/lytes (MIRALAX) PACKET 1 Packet  1 Packet QDAY PRN Angela Mendoza M.D.        And   • magnesium hydroxide (MILK OF MAGNESIA) suspension 30 mL  30 mL QDAY PRN Angela Mendoza M.D.        And   • bisacodyl (DULCOLAX) suppository 10 mg  10 mg QDAY PRN Angela Mendoza M.D.       • heparin injection 5,000 Units  5,000 Units Q8HRS Angela Mendoza M.D.   5,000 Units at 05/02/17 0612   • acetaminophen (TYLENOL) tablet 650 mg  650 mg Q6HRS PRN Angela Mendoza M.D.       • labetalol (NORMODYNE,TRANDATE) injection 10 mg  10 mg Q4HRS PRN Angela Mendoza M.D.       • clonidine (CATAPRES) tablet 0.1 mg  0.1 mg Q6HRS PRN Angela Mendoza M.D.       • hydrALAZINE (APRESOLINE) tablet 50 mg  50 mg Q6HRS Angela Mendoza M.D.   50 mg at 05/02/17 0612   • ondansetron (ZOFRAN) syringe/vial injection 4 mg  4 mg Q4HRS PRN Angela Mendoza M.D.       • ondansetron (ZOFRAN ODT) dispertab 4 mg  4 mg Q4HRS PRN Angela Mendoza M.D.       • promethazine  (PHENERGAN) tablet 12.5-25 mg  12.5-25 mg Q4HRS PRN Angela Mendoza M.D.       • promethazine (PHENERGAN) suppository 12.5-25 mg  12.5-25 mg Q4HRS PRN Angela Mendoza M.D.       • prochlorperazine (COMPAZINE) injection 5-10 mg  5-10 mg Q4HRS PRN Angela Mendoza M.D.       • zolpidem (AMBIEN) tablet 5 mg  5 mg HS PRN - MR X 1 Angela Mendoza M.D.       • lorazepam (ATIVAN) tablet 0.5 mg  0.5 mg Q6HRS PRN Angela Mendoza M.D.        Or   • lorazepam (ATIVAN) injection 0.5 mg  0.5 mg Q6HRS PRN Angela Mendoza M.D.       • hydrocodone/acetaminophen (NORCO)  MG per tablet 1 Tab  1 Tab Q6HRS Angela Mendoza M.D.   1 Tab at 05/02/17 0305   • furosemide (LASIX) injection 20 mg  20 mg BID DIURETIC Adelfo Mijares M.D.       • doxazosin (CARDURA) tablet 2 mg  2 mg QHS Adelfo Mijares M.D.   2 mg at 05/01/17 2338   • metoprolol (LOPRESSOR) tablet 50 mg  50 mg Q8HRS Adelfo Mijares M.D.   50 mg at 05/01/17 2338     Last reviewed on 5/1/2017  8:02 PM by Angela Mendoza M.D.  Medications reviewed    Imaging reviewed      Impressions:  1. Hypertensive emergency  2. Morbid obesity  3. Dyspnea related to above, improved  4. Likely ADRIANNA, untreated    Recommendations:  Add amlodipine  Reduce hydralazine dosing frequency  Lopressor (no labetalol, pt had reaction)  Cardura  Lisinopril  Lasix  E&M ADRIANNA  FU Dr. CLIFFORD
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
## 2017-05-02 NOTE — ED NOTES
Med rec updated and complete.  Allergies reviewed.  Pt recently started on METOPROLOL last week.  Pt was taking labetalol but had to many side effects.    Pt took an extra dose of METOPROLOL today about 1400  Because his blood pressure was elevated.  No antibiotics in last 30 days.

## 2017-05-02 NOTE — PROGRESS NOTES
Pt dc'd home. IV and monitor removed; monitor room notified. Pt left unit via walking with wife. Refused hospital escort; Personal belongings with pt when leaving unit. Pt given discharge instructions prior to leaving unit including prescription and when to visit with physician; verbalizes understanding. Copy of discharge instructions with pt and in the chart.

## 2017-05-02 NOTE — PROGRESS NOTES
Pt seen and examined  Chart reviewed  Uncontrolled HTN  Prob partly from sleep apnea and chronic pain  Polycythemia  Prob BPH  Recent med change  Add amlodipine and doxazosin  Need weight reduction and sleep study

## 2017-05-02 NOTE — PROGRESS NOTES
Received in bed, aox4, st  on monitor. Assessment as per CDU. Call light within reach. Needs attended. Plan of care discussed and understood.
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
## 2017-05-02 NOTE — ED NOTES
Assumed care of pt.     Pt sitting up in bed, looking very uncomfortable, re arranged bed to make as comfortable as possible. States that chest pain is better after the NITRO

## 2017-05-02 NOTE — PROGRESS NOTES
Bedside report received 0705 POC discussed with pt; Pt c/o back pain and is agitated. Medicated per MAR; Pt anxious to get home; No over night events, just difficulty sleeping; Pt discussed concerns with ADAN Swain at bedside;  all questions answered at this time.

## 2017-05-03 NOTE — DISCHARGE SUMMARY
CODE STATUS:  Full code.    PRIMARY CARE PROVIDER:  Aaron Chan MD    DISCHARGE DIAGNOSES:  1.  Hypertensive urgency, resolved.  2.  Morbid obesity, due to excess calories.  3.  Polycythemia.  4.  Obstructive sleep apnea.    CHRONIC MEDICAL PROBLEMS:  1.  Hypertensive cardiomegaly with heart failure.  2.  Tobacco use disorder.  3.  BMI 40-44.9 adult.  4.  Chronic diastolic congestive heart failure.  5.  Obstructive sleep apnea.    STUDIES:  Hematology:  WBC is 11.7, hemoglobin and hematocrit 17.6 and 48.8,   platelets of 126.  Chemistry panel:  Sodium 136, potassium 3.5, anion gap of   13, glucose of 134, otherwise unremarkable chemistry panel.  Lactic acid   initially 2.7 resolved to 1.7 prior to discharge.  Total cholesterol 121,   triglycerides 253, HDL 22 and LDL 48.  Troponins negative at 0.02.  D-dimer   less than 200.  Urinalysis completely unremarkable.  TSH 0.870.    IMAGING STUDIES:  1.  Chest x-ray indicates cardiomegaly again noted.  No infiltrates or   consolidations are identified.  Unchanged from previous exam.  2.  CTA of the chest indicates no evidence of thoracic or abdominal aortic   dissection.  There is an ascending thoracic aortic aneurysm measuring 4.5x4.0   cm.  There is no evidence of abdominal aortic aneurysm.  Atherosclerosis with   coronary artery calcification.  Hepatosplenomegaly.  Diffuse hepatic fatty   infiltration.  Diverticulosis without diverticulitis.  3.  EKG indicates sinus rhythm with a rate of 84.  Patient's last   echocardiogram was done on 12/03/2016 and indicated left ventricular ejection   fraction of 55%.    CONSULTS:  Adelfo Mijares MD     PROCEDURES:  None.    HOSPITAL COURSE:  H and P on admission done by Dr. Angela Mendoza.  Please see   his full H and P for further details.  Briefly, this is a 47-year-old   gentleman who presented to the emergency department with chest tightness and   shortness of breath.  He reports that he was having shortness of  breath,   headaches, nausea, and bilateral arm weakness.  Patient does report chronic   leg swelling, but not worsening prior to arrival.  Patient was admitted to the   clinical decision unit in stable condition.  His blood pressure was elevated   on admission and he is reporting as high in the 200s/100s prior to admission.    It was controlled here with medications and we made some changes as well with   cardiology's recommendations.  We also diuresed the patient with Lasix and he   did report improvement.  We discussed the importance of tobacco cessation as   well as increasing overall physical activity to facilitate weight loss.  The   patient reports that he is aware of what he needs to do, it is unclear how   motivated he is at this time.    On day of discharge, patient reports that his chest pain and shortness of   breath has greatly improved, his blood pressure is well controlled and the   rest of his vital signs are stable.  His lab work was essentially unremarkable   and patient reports that he feels better and would like to go home.  We did   discuss the importance of following up for an outpatient sleep study as this   may be contributing to his overall health and wellness.  He has also been   advised to contact his primary care provider for the standard prostate   screening as he was indicating frequent urination at night; however, his   urinalysis is completely unremarkable.  He states clear understanding of these   instructions.  It was also written on his discharge and reviewed with both   patient and his wife and they state they will follow up as needed.    DISPOSITION:  To home.    CONDITION:  He is stable.    ACTIVITY:  As tolerated.    DIET:  Healthy cardiac diet, avoiding drugs, alcohol and nicotine use.  Also,   increasing overall physical activity to increased weight loss and overall   health and wellness.    MEDICATIONS:  1.  Amlodipine 10 mg tablets, take 1 by mouth every day.  2.  Cardura 2  mg tablets, take 1 by mouth every bedtime, hold for systolic   blood pressure less than 120.  3.  Hydralazine 50 mg tablets, take 1 by mouth 3 times a day.  4.  Zyrtec 10 mg tablets, take 1 by mouth every day.  5.  Diazepam 10 mg tablets, take 1 by mouth twice a day as needed for anxiety.  6.  Lasix 40 mg tablets, take 1 by mouth every day.  7.  Norco 10/325 mg tablets, take 1-2 by mouth every 4-6 hours, only as needed   for pain.  8.  Lisinopril 40 mg tablets, take 1 by mouth 2 times a day.  9.  Metoprolol 50 mg tablets, take 1 by mouth 2 times a day.  10.  Oxycodone 30 mg immediate release tablet, take 1 by mouth every 4 hours   as needed for severe pain.  The above medications were reviewed with   cardiology.    FOLLOWUP:  1.  Dr. Mijares on 05/04/2017 at 01:40 p.m.  2.  Dr. Aaron Chan, May10th at 3:00 p.m.  3.  Sleep study.  Patient is advised to follow up as discussed for an   outpatient sleep study.    INSTRUCTIONS:  Advised to return to the emergency department with any   worsening or concerning symptoms and also strongly advised to follow up as   discussed with your primary care provider next week and Dr. Mijares in 2   days.    Time spent on discharge, 38 minutes.       ____________________________________     ADAN Swain / ERIN    DD:  05/02/2017 13:07:25  DT:  05/03/2017 00:24:34    D#:  [id number]  Job#:  [id number]    cc: NGOZI MIJARES MD, Aaron Chan MD, Ted Rausch MD

## 2017-05-08 ENCOUNTER — HOSPITAL ENCOUNTER (INPATIENT)
Facility: MEDICAL CENTER | Age: 47
LOS: 2 days | DRG: 291 | End: 2017-05-10
Attending: EMERGENCY MEDICINE | Admitting: HOSPITALIST
Payer: COMMERCIAL

## 2017-05-08 ENCOUNTER — RESOLUTE PROFESSIONAL BILLING HOSPITAL PROF FEE (OUTPATIENT)
Dept: HOSPITALIST | Facility: MEDICAL CENTER | Age: 47
End: 2017-05-08
Payer: COMMERCIAL

## 2017-05-08 ENCOUNTER — APPOINTMENT (OUTPATIENT)
Dept: RADIOLOGY | Facility: MEDICAL CENTER | Age: 47
DRG: 291 | End: 2017-05-08
Attending: EMERGENCY MEDICINE
Payer: COMMERCIAL

## 2017-05-08 DIAGNOSIS — R06.01 ORTHOPNEA: ICD-10-CM

## 2017-05-08 DIAGNOSIS — R09.02 HYPOXIA: ICD-10-CM

## 2017-05-08 DIAGNOSIS — I50.32 CHRONIC DIASTOLIC CONGESTIVE HEART FAILURE (HCC): ICD-10-CM

## 2017-05-08 DIAGNOSIS — R06.09 DYSPNEA ON EXERTION: ICD-10-CM

## 2017-05-08 DIAGNOSIS — R60.1 ANASARCA: ICD-10-CM

## 2017-05-08 PROBLEM — I10 ACCELERATED HYPERTENSION: Status: ACTIVE | Noted: 2017-05-08

## 2017-05-08 LAB
ALBUMIN SERPL BCP-MCNC: 4.4 G/DL (ref 3.2–4.9)
ALBUMIN/GLOB SERPL: 1.6 G/DL
ALP SERPL-CCNC: 72 U/L (ref 30–99)
ALT SERPL-CCNC: 48 U/L (ref 2–50)
ANION GAP SERPL CALC-SCNC: 11 MMOL/L (ref 0–11.9)
APPEARANCE UR: CLEAR
AST SERPL-CCNC: 26 U/L (ref 12–45)
BASOPHILS # BLD AUTO: 0.7 % (ref 0–1.8)
BASOPHILS # BLD: 0.06 K/UL (ref 0–0.12)
BILIRUB SERPL-MCNC: 0.5 MG/DL (ref 0.1–1.5)
BILIRUB UR QL STRIP.AUTO: NEGATIVE
BNP SERPL-MCNC: 31 PG/ML (ref 0–100)
BUN SERPL-MCNC: 18 MG/DL (ref 8–22)
CALCIUM SERPL-MCNC: 9.1 MG/DL (ref 8.5–10.5)
CHLORIDE SERPL-SCNC: 106 MMOL/L (ref 96–112)
CO2 SERPL-SCNC: 22 MMOL/L (ref 20–33)
COLOR UR: NORMAL
CREAT SERPL-MCNC: 1.5 MG/DL (ref 0.5–1.4)
EOSINOPHIL # BLD AUTO: 0.42 K/UL (ref 0–0.51)
EOSINOPHIL NFR BLD: 5.1 % (ref 0–6.9)
ERYTHROCYTE [DISTWIDTH] IN BLOOD BY AUTOMATED COUNT: 43.8 FL (ref 35.9–50)
GFR SERPL CREATININE-BSD FRML MDRD: 50 ML/MIN/1.73 M 2
GLOBULIN SER CALC-MCNC: 2.7 G/DL (ref 1.9–3.5)
GLUCOSE SERPL-MCNC: 100 MG/DL (ref 65–99)
GLUCOSE UR STRIP.AUTO-MCNC: NEGATIVE MG/DL
HCT VFR BLD AUTO: 45.5 % (ref 42–52)
HGB BLD-MCNC: 16.1 G/DL (ref 14–18)
IMM GRANULOCYTES # BLD AUTO: 0.09 K/UL (ref 0–0.11)
IMM GRANULOCYTES NFR BLD AUTO: 1.1 % (ref 0–0.9)
KETONES UR STRIP.AUTO-MCNC: NEGATIVE MG/DL
LEUKOCYTE ESTERASE UR QL STRIP.AUTO: NEGATIVE
LYMPHOCYTES # BLD AUTO: 2.26 K/UL (ref 1–4.8)
LYMPHOCYTES NFR BLD: 27.4 % (ref 22–41)
MCH RBC QN AUTO: 32.9 PG (ref 27–33)
MCHC RBC AUTO-ENTMCNC: 35.4 G/DL (ref 33.7–35.3)
MCV RBC AUTO: 92.9 FL (ref 81.4–97.8)
MICRO URNS: NORMAL
MONOCYTES # BLD AUTO: 0.56 K/UL (ref 0–0.85)
MONOCYTES NFR BLD AUTO: 6.8 % (ref 0–13.4)
NEUTROPHILS # BLD AUTO: 4.85 K/UL (ref 1.82–7.42)
NEUTROPHILS NFR BLD: 58.9 % (ref 44–72)
NITRITE UR QL STRIP.AUTO: NEGATIVE
NRBC # BLD AUTO: 0 K/UL
NRBC BLD AUTO-RTO: 0 /100 WBC
PH UR STRIP.AUTO: 5 [PH]
PLATELET # BLD AUTO: 174 K/UL (ref 164–446)
PMV BLD AUTO: 11.1 FL (ref 9–12.9)
POTASSIUM SERPL-SCNC: 3.8 MMOL/L (ref 3.6–5.5)
PROT SERPL-MCNC: 7.1 G/DL (ref 6–8.2)
PROT UR QL STRIP: NEGATIVE MG/DL
RBC # BLD AUTO: 4.9 M/UL (ref 4.7–6.1)
RBC UR QL AUTO: NEGATIVE
SODIUM SERPL-SCNC: 139 MMOL/L (ref 135–145)
SP GR UR STRIP.AUTO: 1.01
TROPONIN I SERPL-MCNC: 0.03 NG/ML (ref 0–0.04)
WBC # BLD AUTO: 8.2 K/UL (ref 4.8–10.8)

## 2017-05-08 PROCEDURE — 93005 ELECTROCARDIOGRAM TRACING: CPT | Performed by: EMERGENCY MEDICINE

## 2017-05-08 PROCEDURE — 83880 ASSAY OF NATRIURETIC PEPTIDE: CPT

## 2017-05-08 PROCEDURE — 81003 URINALYSIS AUTO W/O SCOPE: CPT

## 2017-05-08 PROCEDURE — 71010 DX-CHEST-PORTABLE (1 VIEW): CPT

## 2017-05-08 PROCEDURE — 96374 THER/PROPH/DIAG INJ IV PUSH: CPT

## 2017-05-08 PROCEDURE — 94760 N-INVAS EAR/PLS OXIMETRY 1: CPT

## 2017-05-08 PROCEDURE — 304562 HCHG STAT O2 MASK/CANNULA

## 2017-05-08 PROCEDURE — 700111 HCHG RX REV CODE 636 W/ 250 OVERRIDE (IP): Performed by: EMERGENCY MEDICINE

## 2017-05-08 PROCEDURE — 99223 1ST HOSP IP/OBS HIGH 75: CPT | Performed by: HOSPITALIST

## 2017-05-08 PROCEDURE — 770020 HCHG ROOM/CARE - TELE (206)

## 2017-05-08 PROCEDURE — 85025 COMPLETE CBC W/AUTO DIFF WBC: CPT

## 2017-05-08 PROCEDURE — 84484 ASSAY OF TROPONIN QUANT: CPT

## 2017-05-08 PROCEDURE — 304561 HCHG STAT O2

## 2017-05-08 PROCEDURE — 80053 COMPREHEN METABOLIC PANEL: CPT

## 2017-05-08 PROCEDURE — 99285 EMERGENCY DEPT VISIT HI MDM: CPT

## 2017-05-08 PROCEDURE — 36415 COLL VENOUS BLD VENIPUNCTURE: CPT

## 2017-05-08 RX ORDER — HYDRALAZINE HYDROCHLORIDE 50 MG/1
50 TABLET, FILM COATED ORAL 3 TIMES DAILY
Status: DISCONTINUED | OUTPATIENT
Start: 2017-05-08 | End: 2017-05-10 | Stop reason: HOSPADM

## 2017-05-08 RX ORDER — ONDANSETRON 4 MG/1
4 TABLET, ORALLY DISINTEGRATING ORAL EVERY 4 HOURS PRN
Status: DISCONTINUED | OUTPATIENT
Start: 2017-05-08 | End: 2017-05-10 | Stop reason: HOSPADM

## 2017-05-08 RX ORDER — AMOXICILLIN 250 MG
2 CAPSULE ORAL 2 TIMES DAILY
Status: DISCONTINUED | OUTPATIENT
Start: 2017-05-08 | End: 2017-05-10 | Stop reason: HOSPADM

## 2017-05-08 RX ORDER — DIAZEPAM 5 MG/1
10 TABLET ORAL 2 TIMES DAILY PRN
Status: DISCONTINUED | OUTPATIENT
Start: 2017-05-08 | End: 2017-05-10 | Stop reason: HOSPADM

## 2017-05-08 RX ORDER — HYDROCODONE BITARTRATE AND ACETAMINOPHEN 10; 325 MG/1; MG/1
1 TABLET ORAL EVERY 4 HOURS
Status: DISCONTINUED | OUTPATIENT
Start: 2017-05-08 | End: 2017-05-10 | Stop reason: HOSPADM

## 2017-05-08 RX ORDER — FUROSEMIDE 10 MG/ML
40 INJECTION INTRAMUSCULAR; INTRAVENOUS
Status: DISCONTINUED | OUTPATIENT
Start: 2017-05-08 | End: 2017-05-09

## 2017-05-08 RX ORDER — CETIRIZINE HYDROCHLORIDE 10 MG/1
10 TABLET ORAL DAILY
Status: DISCONTINUED | OUTPATIENT
Start: 2017-05-09 | End: 2017-05-10 | Stop reason: HOSPADM

## 2017-05-08 RX ORDER — BISACODYL 10 MG
10 SUPPOSITORY, RECTAL RECTAL
Status: DISCONTINUED | OUTPATIENT
Start: 2017-05-08 | End: 2017-05-10 | Stop reason: HOSPADM

## 2017-05-08 RX ORDER — LISINOPRIL 20 MG/1
40 TABLET ORAL 2 TIMES DAILY
Status: DISCONTINUED | OUTPATIENT
Start: 2017-05-08 | End: 2017-05-10 | Stop reason: HOSPADM

## 2017-05-08 RX ORDER — HEPARIN SODIUM 5000 [USP'U]/ML
5000 INJECTION, SOLUTION INTRAVENOUS; SUBCUTANEOUS EVERY 8 HOURS
Status: DISCONTINUED | OUTPATIENT
Start: 2017-05-08 | End: 2017-05-10 | Stop reason: HOSPADM

## 2017-05-08 RX ORDER — POLYETHYLENE GLYCOL 3350 17 G/17G
1 POWDER, FOR SOLUTION ORAL
Status: DISCONTINUED | OUTPATIENT
Start: 2017-05-08 | End: 2017-05-10 | Stop reason: HOSPADM

## 2017-05-08 RX ORDER — DOXAZOSIN 2 MG/1
2 TABLET ORAL
Status: DISCONTINUED | OUTPATIENT
Start: 2017-05-08 | End: 2017-05-10 | Stop reason: HOSPADM

## 2017-05-08 RX ORDER — ONDANSETRON 2 MG/ML
4 INJECTION INTRAMUSCULAR; INTRAVENOUS EVERY 4 HOURS PRN
Status: DISCONTINUED | OUTPATIENT
Start: 2017-05-08 | End: 2017-05-10 | Stop reason: HOSPADM

## 2017-05-08 RX ORDER — METOPROLOL TARTRATE 50 MG/1
50 TABLET, FILM COATED ORAL 2 TIMES DAILY
Status: DISCONTINUED | OUTPATIENT
Start: 2017-05-08 | End: 2017-05-10 | Stop reason: HOSPADM

## 2017-05-08 RX ORDER — PROMETHAZINE HYDROCHLORIDE 25 MG/1
12.5-25 TABLET ORAL EVERY 4 HOURS PRN
Status: DISCONTINUED | OUTPATIENT
Start: 2017-05-08 | End: 2017-05-10 | Stop reason: HOSPADM

## 2017-05-08 RX ORDER — PROMETHAZINE HYDROCHLORIDE 25 MG/1
12.5-25 SUPPOSITORY RECTAL EVERY 4 HOURS PRN
Status: DISCONTINUED | OUTPATIENT
Start: 2017-05-08 | End: 2017-05-10 | Stop reason: HOSPADM

## 2017-05-08 RX ADMIN — BUMETANIDE 0.5 MG: 0.25 INJECTION, SOLUTION INTRAMUSCULAR; INTRAVENOUS at 21:56

## 2017-05-08 ASSESSMENT — PAIN SCALES - GENERAL: PAINLEVEL_OUTOF10: 6

## 2017-05-08 NOTE — IP AVS SNAPSHOT
SlideJar Access Code: Activation code not generated  Current SlideJar Status: Active    [employer]  A secure, online tool to manage your health information     [employer]’s SlideJar® is a secure, online tool that connects you to your personalized health information from the privacy of your home -- day or night - making it very easy for you to manage your healthcare. Once the activation process is completed, you can even access your medical information using the SlideJar berna, which is available for free in the Apple Berna store or Google Play store.     SlideJar provides the following levels of access (as shown below):   My Chart Features   [facility] Primary Care Doctor [facility]  Specialists [facility]  Urgent  Care Non-[facility]  Primary Care  Doctor   Email your healthcare team securely and privately 24/7 X X X X   Manage appointments: schedule your next appointment; view details of past/upcoming appointments X      Request prescription refills. X      View recent personal medical records, including lab and immunizations X X X X   View health record, including health history, allergies, medications X X X X   Read reports about your outpatient visits, procedures, consult and ER notes X X X X   See your discharge summary, which is a recap of your hospital and/or ER visit that includes your diagnosis, lab results, and care plan. X X       How to register for SlideJar:  1. Go to  [email or web address].  2. Click on the Sign Up Now box, which takes you to the New Member Sign Up page. You will need to provide the following information:  a. Enter your SlideJar Access Code exactly as it appears at the top of this page. (You will not need to use this code after you’ve completed the sign-up process. If you do not sign up before the expiration date, you must request a new code.)   b. Enter your date of birth.   c. Enter your home email address.   d. Click Submit, and follow the next screen’s instructions.  3. Create a SlideJar ID. This will  be your [employer] login ID and cannot be changed, so think of one that is secure and easy to remember.  4. Create a [employer] password. You can change your password at any time.  5. Enter your Password Reset Question and Answer. This can be used at a later time if you forget your password.   6. Enter your e-mail address. This allows you to receive e-mail notifications when new information is available in [employer].  7. Click Sign Up. You can now view your health information.    For assistance activating your [employer] account, call [phone]

## 2017-05-08 NOTE — IP AVS SNAPSHOT
" <p align=\"LEFT\"><IMG SRC=\"//[email or web address]\" alt=\"Image\" WIDTH=\"50%\" HEIGHT=\"200\" BORDER=\"\"></p>                   Name:Giacomo Hunter  Medical Record Number:[id number]  CSN: [id number]    YOB: 1970   Age: 47 y.o.  Sex: male  HT:1.803 m (5' 11\") WT: 139.5 kg (307 lb 8.7 oz)          Admit Date: 5/8/2017     Discharge Date:   Today's Date: 5/10/2017  Attending Doctor:  Ryan Coppola M.D.                  Allergies:  Naproxen; Flu virus vaccine; and Olmesartan-amlodipine-hctz          Your appointments     May 10, 2017  2:45 PM   Heart Failure New with Cole Teran M.D.   [facility] (--)    [address]  [place] [id number] [phone]            May 18, 2017  2:40 PM   Established Patient with Aaron Chan M.D.   [facility])    [address]  [address]  [place] [id number] [phone]           You will be receiving a confirmation call a few days before your appointment from our automated call confirmation system.                 Medication List      Take these Medications        Instructions    cetirizine 10 MG Tabs   Commonly known as:  ZYRTEC    Take 10 mg by mouth every day.   Dose:  10 mg       diazepam 10 MG tablet   Commonly known as:  VALIUM    Take 10 mg by mouth 2 times a day as needed for Anxiety.   Dose:  10 mg       doxazosin 2 MG Tabs   Commonly known as:  CARDURA    Doctor's comments:  Hold for Systolic Blood pressure (top number) < 120   Take 1 Tab by mouth every bedtime.   Dose:  2 mg       furosemide 40 MG Tabs   What changed:  additional instructions   Commonly known as:  LASIX    Take 1 Tab by mouth every day. And one-half tab in the afternoon.   Dose:  40 mg       hydrALAZINE 50 MG Tabs   Commonly known as:  APRESOLINE    Take 1 Tab by mouth 3 times a day.   Dose:  50 mg       hydrocodone/acetaminophen  MG Tabs   Commonly known as:  NORCO    Take " 1-2 Tabs by mouth every 4 hours.   Dose:  1-2 Tab       lisinopril 40 MG tablet   Commonly known as:  PRINIVIL, ZESTRIL    Take 40 mg by mouth 2 times a day.   Dose:  40 mg       magnesium oxide 400 MG Tabs   Commonly known as:  MAG-OX    Take 500 mg by mouth every day.   Dose:  500 mg       metoprolol 50 MG Tabs   Commonly known as:  LOPRESSOR    Take 1 Tab by mouth 2 times a day.   Dose:  50 mg       oxycodone 30 MG immediate release tablet   Commonly known as:  OXY-IR    Doctor's comments:  Last refill.  Must establish with physiatry.   Take 1 Tab by mouth every four hours as needed for Moderate Pain.   Dose:  30 mg       potassium chloride 20 MEQ Pack   Commonly known as:  KLOR-CON    Take 2 Packets by mouth 2 times a day. Indications: lasix   Dose:  40 mEq

## 2017-05-08 NOTE — IP AVS SNAPSHOT
5/10/2017    Giacomo Hunter  [address]  [place] [address]    Dear Giacomo:    [facility] wants to ensure your discharge home is safe and you or your loved ones have had all of your questions answered regarding your care after you leave the hospital.    Below is a list of resources and contact information should you have any questions regarding your hospital stay, follow-up instructions, or active medical symptoms.    Questions or Concerns Regarding… Contact   Medical Questions Related to Your Discharge  (7 days a week, 8am-5pm) Contact a Nurse Care Coordinator   [phone]   Medical Questions Not Related to Your Discharge  (24 hours a day / 7 days a week)  Contact the Nurse Health Line   [phone]    Medications or Discharge Instructions Refer to your discharge packet   or contact your [facility] Primary Care Provider   [phone]   Follow-up Appointment(s) Schedule your appointment via [employer]   or contact Scheduling [phone]   Billing Review your statement via [employer]  or contact Billing [phone]   Medical Records Review your records via [employer]   or contact Medical Records [phone]     You may receive a telephone call within two days of discharge. This call is to make certain you understand your discharge instructions and have the opportunity to have any questions answered. You can also easily access your medical information, test results and upcoming appointments via the [employer] free online health management tool. You can learn more and sign up at [employer]. For assistance setting up your [employer] account, please call [phone].    Once again, we want to ensure your discharge home is safe and that you have a clear understanding of any next steps in your care. If you have any questions or concerns, please do not hesitate to contact us, we are here for you. Thank you for choosing [facility] for your healthcare needs.    Sincerely,    Your [facility] Healthcare Team

## 2017-05-08 NOTE — IP AVS SNAPSHOT
" Home Care Instructions                                                                                                                  Name:Giacomo Hunter  Medical Record Number:[id number]  CSN: [id number]    YOB: 1970   Age: 47 y.o.  Sex: male  HT:1.803 m (5' 11\") WT: 139.5 kg (307 lb 8.7 oz)          Admit Date: 5/8/2017     Discharge Date:   Today's Date: 5/10/2017  Attending Doctor:  Ryan Coppola M.D.                  Allergies:  Naproxen; Flu virus vaccine; and Olmesartan-amlodipine-hctz            Discharge Instructions       Discharge Instructions    Discharged to home by car with relative. Discharged via wheelchair, hospital escort: Yes.  Special equipment needed: Not Applicable    Be sure to schedule a follow-up appointment with your primary care doctor or any specialists as instructed.     Discharge Plan:   Smoking Cessation Offered: Patient Refused  Influenza Vaccine Indication: Patient Refuses (allergic)    I understand that a diet low in cholesterol, fat, and sodium is recommended for good health. Unless I have been given specific instructions below for another diet, I accept this instruction as my diet prescription.   Other diet: Heart Healthy, low sodium    Special Instructions:   HF Patient Discharge Instructions  · Monitor your weight daily, and maintain a weight chart, to track your weight changes.   · Activity as tolerated, unless your Doctor has ordered otherwise. Other activity order: As tolerated.  · Follow a low fat, low cholesterol, low salt diet unless instructed otherwise by your Doctor. Read the labels on the back of food products and track your intake of fat, cholesterol and salt.   · Fluid Restriction No. If a Fluid Restriction has been ordered by your Doctor, measure fluids with a measuring cup to ensure that you are not exceeding the restriction.   · No smoking.  · Oxygen No. If your Doctor has ordered that you wear Oxygen at home, it is important to wear it as " ordered.  · Did you receive an explanation from staff on the importance of taking each of your medications and why it is necessary to keep taking them unless your doctor says to stop? Yes  · Were all of your questions answered about how to manage your heart failure and what to do if you have increased signs and symptoms after you go home? Yes  · Do you feel like your heart failure care team involved you in the care treatment plan and allowed you to make decisions regarding your care while in the hospital and addressed any discharge needs you might have? Yes    See the educational handout provided at discharge for more information on monitoring your daily weight, activity and diet. This also explains more about Heart Failure, symptoms of a flare-up and some of the tests that you have undergone.     Warning Signs of a Flare-Up include:  · Swelling in the ankles or lower legs.  · Shortness of breath, while at rest, or while doing normal activities.   · Shortness of breath at night when in bed, or coughing in bed.   · Requiring more pillows to sleep at night, or needing to sit up at night to sleep.  · Feeling weak, dizzy or fatigued.     When to call your Doctor:  · Call [employer] seven days a week from 8:00 a.m. to 8:00 p.m. for medical questions [phone].  · Call your Primary Care Physician or Cardiologist if:   1. You experience any pain radiating to your jaw or neck.  2. You have any difficulty breathing.  3. You experience weight gain of 3 lbs in a day or 5 lbs in a week.   4. You feel any palpitations or irregular heartbeats.  5. You become dizzy or lose consciousness.   If you have had an angiogram or had a pacemaker or AICD placed, and experience:  1. Bleeding, drainage or swelling at the surgical / puncture site.  2. Fever greater than 100.0 F  3. Shock from internal defibrillator.  4. Cool and / or numb extremities.    No   · Is patient discharged on Warfarin / Coumadin?       · Is patient  Post Blood Transfusion?  No    Depression / Suicide Risk    As you are discharged from this [facility] facility, it is important to learn how to keep safe from harming yourself.    Recognize the warning signs:  · Abrupt changes in personality, positive or negative- including increase in energy   · Giving away possessions  · Change in eating patterns- significant weight changes-  positive or negative  · Change in sleeping patterns- unable to sleep or sleeping all the time   · Unwillingness or inability to communicate  · Depression  · Unusual sadness, discouragement and loneliness  · Talk of wanting to die  · Neglect of personal appearance   · Rebelliousness- reckless behavior  · Withdrawal from people/activities they love  · Confusion- inability to concentrate     If you or a loved one observes any of these behaviors or has concerns about self-harm, here's what you can do:  · Talk about it- your feelings and reasons for harming yourself  · Remove any means that you might use to hurt yourself (examples: pills, rope, extension cords, firearm)  · Get professional help from the community (Mental Health, Substance Abuse, psychological counseling)  · Do not be alone:Call your Safe Contact- someone whom you trust who will be there for you.  · Call your local CRISIS HOTLINE 895-4958 or 133-045-0942  · Call your local Children's Mobile Crisis Response Team [place] (109) 184-5485 or www.CallMiner  · Call the toll free National Suicide Prevention Hotlines   · National Suicide Prevention Lifeline 705-262-EHVC (2667)  · National Hope Line Network 800-SUICIDE (629-6982)    Heart-Healthy Eating Plan  Heart-healthy meal planning includes:  · Limiting unhealthy fats.  · Increasing healthy fats.  · Making other small dietary changes.  You may need to talk with your doctor or a diet specialist (dietitian) to create an eating plan that is right for you.  WHAT TYPES OF FAT SHOULD I CHOOSE?  · Choose healthy fats. These  "include olive oil and canola oil, flaxseeds, walnuts, almonds, and seeds.  · Eat more omega-3 fats. These include salmon, mackerel, sardines, tuna, flaxseed oil, and ground flaxseeds. Try to eat fish at least twice each week.  · Limit saturated fats.  ¨ Saturated fats are often found in animal products, such as meats, butter, and cream.  ¨ Plant sources of saturated fats include palm oil, palm kernel oil, and coconut oil.  · Avoid foods with partially hydrogenated oils in them. These include stick margarine, some tub margarines, cookies, crackers, and other baked goods. These contain trans fats.  WHAT GENERAL GUIDELINES DO I NEED TO FOLLOW?  · Check food labels carefully. Identify foods with trans fats or high amounts of saturated fat.  · Fill one half of your plate with vegetables and green salads. Eat 4-5 servings of vegetables per day. A serving of vegetables is:  ¨ 1 cup of raw leafy vegetables.  ¨ ½ cup of raw or cooked cut-up vegetables.  ¨ ½ cup of vegetable juice.  · Fill one fourth of your plate with whole grains. Look for the word \"whole\" as the first word in the ingredient list.  · Fill one fourth of your plate with lean protein foods.  · Eat 4-5 servings of fruit per day. A serving of fruit is:  ¨ One medium whole fruit.  ¨ ¼ cup of dried fruit.  ¨ ½ cup of fresh, frozen, or canned fruit.  ¨ ½ cup of 100% fruit juice.  · Eat more foods that contain soluble fiber. These include apples, broccoli, carrots, beans, peas, and barley. Try to get 20-30 g of fiber per day.  · Eat more home-cooked food. Eat less restaurant, buffet, and fast food.  · Limit or avoid alcohol.  · Limit foods high in starch and sugar.  · Avoid fried foods.  · Avoid frying your food. Try baking, boiling, grilling, or broiling it instead. You can also reduce fat by:  ¨ Removing the skin from poultry.  ¨ Removing all visible fats from meats.  ¨ Skimming the fat off of stews, soups, and gravies before serving them.  ¨ Steaming vegetables " in water or broth.  · Lose weight if you are overweight.  · Eat 4-5 servings of nuts, legumes, and seeds per week:  ¨ One serving of dried beans or legumes equals ½ cup after being cooked.  ¨ One serving of nuts equals 1½ ounces.  ¨ One serving of seeds equals ½ ounce or one tablespoon.  · You may need to keep track of how much salt or sodium you eat. This is especially true if you have high blood pressure. Talk with your doctor or dietitian to get more information.  WHAT FOODS CAN I EAT?  Grains  Breads, including Malawian, white, ciara, wheat, raisin, rye, oatmeal, and Italian. Tortillas that are neither fried nor made with lard or trans fat. Low-fat rolls, including hotdog and hamburger buns and English muffins. Biscuits. Muffins. Waffles. Pancakes. Light popcorn. Whole-grain cereals. Flatbread. Chandrika toast. Pretzels. Breadsticks. Rusks. Low-fat snacks. Low-fat crackers, including oyster, saltine, matzo, federico, animal, and rye. Rice and pasta, including brown rice and pastas that are made with whole wheat.   Vegetables  All vegetables.   Fruits  All fruits, but limit coconut.  Meats and Other Protein Sources  Lean, well-trimmed beef, veal, pork, and lamb. Chicken and turkey without skin. All fish and shellfish. Wild duck, rabbit, pheasant, and venison. Egg whites or low-cholesterol egg substitutes. Dried beans, peas, lentils, and tofu. Seeds and most nuts.  Dairy  Low-fat or nonfat cheeses, including ricotta, string, and mozzarella. Skim or 1% milk that is liquid, powdered, or evaporated. Buttermilk that is made with low-fat milk. Nonfat or low-fat yogurt.  Beverages  Mineral water. Diet carbonated beverages.  Sweets and Desserts  Sherbets and fruit ices. Honey, jam, marmalade, jelly, and syrups. Meringues and gelatins. Pure sugar candy, such as hard candy, jelly beans, gumdrops, mints, marshmallows, and small amounts of dark chocolate. Jared food cake.  Eat all sweets and desserts in moderation.  Fats and  Oils  Nonhydrogenated (trans-free) margarines. Vegetable oils, including soybean, sesame, sunflower, olive, peanut, safflower, corn, canola, and cottonseed. Salad dressings or mayonnaise made with a vegetable oil. Limit added fats and oils that you use for cooking, baking, salads, and as spreads.  Other  Cocoa powder. Coffee and tea. All seasonings and condiments.  The items listed above may not be a complete list of recommended foods or beverages. Contact your dietitian for more options.  WHAT FOODS ARE NOT RECOMMENDED?  Grains  Breads that are made with saturated or trans fats, oils, or whole milk. Croissants. Butter rolls. Cheese breads. Sweet rolls. Donuts. Buttered popcorn. Chow mein noodles. High-fat crackers, such as cheese or butter crackers.  Meats and Other Protein Sources  Fatty meats, such as hotdogs, short ribs, sausage, spareribs, enriquez, rib eye roast or steak, and mutton. High-fat deli meats, such as salami and bologna. Caviar. Domestic duck and goose. Organ meats, such as kidney, liver, sweetbreads, and heart.  Dairy  Cream, sour cream, cream cheese, and creamed cottage cheese. Whole-milk cheeses, including blue (kinga), [place], Brie, Ritchie, American, Havarti, Swiss, cheddar, Camembert, and [place]. Whole or 2% milk that is liquid, evaporated, or condensed. Whole buttermilk. Cream sauce or high-fat cheese sauce. Yogurt that is made from whole milk.  Beverages  Regular sodas and juice drinks with added sugar.  Sweets and Desserts  Frosting. Pudding. Cookies. Cakes other than isaura food cake. Candy that has milk chocolate or white chocolate, hydrogenated fat, butter, coconut, or unknown ingredients. Buttered syrups. Full-fat ice cream or ice cream drinks.  Fats and Oils  Gravy that has suet, meat fat, or shortening. Cocoa butter, hydrogenated oils, palm oil, coconut oil, palm kernel oil. These can often be found in baked products, candy, fried foods, nondairy creamers, and whipped toppings.  Solid fats and shortenings, including enriquez fat, salt pork, lard, and butter. Nondairy cream substitutes, such as coffee creamers and sour cream substitutes. Salad dressings that are made of unknown oils, cheese, or sour cream.  The items listed above may not be a complete list of foods and beverages to avoid. Contact your dietitian for more information.     This information is not intended to replace advice given to you by your health care provider. Make sure you discuss any questions you have with your health care provider.     Document Released: 06/18/2013 Document Revised: 01/08/2016 Document Reviewed: 06/11/2015  [employer] Interactive Patient Education ©2016 [employer]      Your appointments     May 10, 2017  2:45 PM   Heart Failure New with Cole Teran M.D.   [facility] (--)    [address]  [place] [address]   [phone]            May 18, 2017  2:40 PM   Established Patient with Aaron Chan M.D.   [facility]    [address]  [address]  [place] [address]   [phone]           You will be receiving a confirmation call a few days before your appointment from our automated call confirmation system.                 Discharge Medication Instructions:    Below are the medications your physician expects you to take upon discharge:    Review all your home medications and newly ordered medications with your doctor and/or pharmacist. Follow medication instructions as directed by your doctor and/or pharmacist.    Please keep your medication list with you and share with your physician.               Medication List      CHANGE how you take these medications        Instructions    Morning Afternoon Evening Bedtime    furosemide 40 MG Tabs   What changed:  additional instructions   Commonly known as:  LASIX   Next Dose Due:  This afternoon        Take 1 Tab by mouth every day. And one-half tab in  the afternoon.   Dose:  40 mg                          CONTINUE taking these medications        Instructions    Morning Afternoon Evening Bedtime    cetirizine 10 MG Tabs   Last time this was given:  10 mg on 5/10/2017  7:13 AM   Commonly known as:  ZYRTEC   Next Dose Due:  Tomorrow AM        Take 10 mg by mouth every day.   Dose:  10 mg                        diazepam 10 MG tablet   Last time this was given:  10 mg on 5/9/2017 10:26 PM   Commonly known as:  VALIUM   Next Dose Due:  As needed        Take 10 mg by mouth 2 times a day as needed for Anxiety.   Dose:  10 mg                        doxazosin 2 MG Tabs   Commonly known as:  CARDURA   Next Dose Due:  Bedtime          Doctor's comments:  Hold for Systolic Blood pressure (top number) < 120   Take 1 Tab by mouth every bedtime.   Dose:  2 mg                        hydrALAZINE 50 MG Tabs   Last time this was given:  50 mg on 5/10/2017  9:32 AM   Commonly known as:  APRESOLINE   Next Dose Due:  3pm        Take 1 Tab by mouth 3 times a day.   Dose:  50 mg                        hydrocodone/acetaminophen  MG Tabs   Last time this was given:  1 Tab on 5/10/2017  9:32 AM   Commonly known as:  NORCO   Next Dose Due:  1400        Take 1-2 Tabs by mouth every 4 hours.   Dose:  1-2 Tab                        lisinopril 40 MG tablet   Last time this was given:  40 mg on 5/10/2017  7:12 AM   Commonly known as:  PRINIVIL, ZESTRIL   Next Dose Due:  9pm        Take 40 mg by mouth 2 times a day.   Dose:  40 mg                        magnesium oxide 400 MG Tabs   Commonly known as:  MAG-OX   Next Dose Due:  Tomorrow AM        Take 500 mg by mouth every day.   Dose:  500 mg                        metoprolol 50 MG Tabs   Last time this was given:  50 mg on 5/10/2017  7:12 AM   Commonly known as:  LOPRESSOR   Next Dose Due:  9pm        Take 1 Tab by mouth 2 times a day.   Dose:  50 mg                        oxycodone 30 MG immediate release tablet   Commonly known as:   OXY-IR        Doctor's comments:  Last refill.  Must establish with physiatry.   Take 1 Tab by mouth every four hours as needed for Moderate Pain.   Dose:  30 mg                        potassium chloride 20 MEQ Pack   Commonly known as:  KLOR-CON   Next Dose Due:  This evening        Take 2 Packets by mouth 2 times a day. Indications: lasix   Dose:  40 mEq                             Where to Get Your Medications      These medications were sent to [id number] - [place], [place] - [address]  [address], [place] [address]     Phone:  [phone]    - furosemide 40 MG Tabs  - potassium chloride 20 MEQ Pack            Instructions           Diet / Nutrition:    Follow any diet instructions given to you by your doctor or the dietician, including how much salt (sodium) you are allowed each day.    If you are overweight, talk to your doctor about a weight reduction plan.    Activity:    Remain physically active following your doctor's instructions about exercise and activity.    Rest often.     Any time you become even a little tired or short of breath, SIT DOWN and rest.    Worsening Symptoms:    Report any of the following signs and symptoms to the doctor's office immediately:    *Pain of jaw, arm, or neck  *Chest pain not relieved by medication                               *Dizziness or loss of consciousness  *Difficulty breathing even when at rest   *More tired than usual                                       *Bleeding drainage or swelling of surgical site  *Swelling of feet, ankles, legs or stomach                 *Fever (>100ºF)  *Pink or blood tinged sputum  *Weight gain (3lbs/day or 5lbs /week)           *Shock from internal defibrillator (if applicable)  *Palpitations or irregular heartbeats                *Cool and/or numb extremities    Stroke Awareness    Common Risk Factors for Stroke include:    Age  Atrial Fibrillation  Carotid Artery Stenosis  Diabetes Mellitus  Excessive alcohol  consumption  High blood pressure  Overweight   Physical inactivity  Smoking    Warning signs and symptoms of a stroke include:    *Sudden numbness or weakness of the face, arm or leg (especially on one side of the body).  *Sudden confusion, trouble speaking or understanding.  *Sudden trouble seeing in one or both eyes.  *Sudden trouble walking, dizziness, loss of balance or coordination.Sudden severe headache with no known cause.    It is very important to get treatment quickly when a stroke occurs. If you experience any of the above warning signs, call 911 immediately.                   Disclaimer         Quit Smoking / Tobacco Use:    I understand the use of any tobacco products increases my chance of suffering from future heart disease or stroke and could cause other illnesses which may shorten my life. Quitting the use of tobacco products is the single most important thing I can do to improve my health. For further information on smoking / tobacco cessation call a Toll Free Quit Line at 1-832.202.2117 (*[employer] [place]) or 1-927.460.4273 (American Lung Association) or you can access the web based program at www.[employer] [employer]ations.org.    [place] Tobacco Users Help Line:  (565) 231-9379       Toll Free: 1-870.166.3115  Quit Tobacco Program [facility] (190)049-2378    Crisis Hotline:    [place] Crisis Hotline:  3-804-LWGZTDA or 1-649.751.3230    [place] Crisis Hotline:    1-634.368.3617 or 655-710-6270    Discharge Survey:   Thank you for choosing [facility]. We hope we did everything we could to make your hospital stay a pleasant one. You may be receiving a phone survey and we would appreciate your time and participation in answering the questions. Your input is very valuable to us in our efforts to improve our service to our patients and their families.        My signature on this form indicates that:    1. I have reviewed and understand the above information.  2. My questions regarding  this information have been answered to my satisfaction.  3. I have formulated a plan with my discharge nurse to obtain my prescribed medications for home.                  Disclaimer         __________________________________                     __________       ________                       Patient Signature                                                 Date                    Time

## 2017-05-09 PROBLEM — I50.33 ACUTE ON CHRONIC DIASTOLIC (CONGESTIVE) HEART FAILURE (HCC): Chronic | Status: ACTIVE | Noted: 2017-04-26

## 2017-05-09 LAB
ANION GAP SERPL CALC-SCNC: 11 MMOL/L (ref 0–11.9)
BASOPHILS # BLD AUTO: 1 % (ref 0–1.8)
BASOPHILS # BLD: 0.07 K/UL (ref 0–0.12)
BUN SERPL-MCNC: 18 MG/DL (ref 8–22)
CALCIUM SERPL-MCNC: 8.9 MG/DL (ref 8.5–10.5)
CHLORIDE SERPL-SCNC: 106 MMOL/L (ref 96–112)
CO2 SERPL-SCNC: 22 MMOL/L (ref 20–33)
CREAT SERPL-MCNC: 1.26 MG/DL (ref 0.5–1.4)
EOSINOPHIL # BLD AUTO: 0.37 K/UL (ref 0–0.51)
EOSINOPHIL NFR BLD: 5.2 % (ref 0–6.9)
ERYTHROCYTE [DISTWIDTH] IN BLOOD BY AUTOMATED COUNT: 45.4 FL (ref 35.9–50)
GFR SERPL CREATININE-BSD FRML MDRD: >60 ML/MIN/1.73 M 2
GLUCOSE SERPL-MCNC: 97 MG/DL (ref 65–99)
HCT VFR BLD AUTO: 46.7 % (ref 42–52)
HGB BLD-MCNC: 15.9 G/DL (ref 14–18)
IMM GRANULOCYTES # BLD AUTO: 0.06 K/UL (ref 0–0.11)
IMM GRANULOCYTES NFR BLD AUTO: 0.8 % (ref 0–0.9)
LYMPHOCYTES # BLD AUTO: 2.16 K/UL (ref 1–4.8)
LYMPHOCYTES NFR BLD: 30.2 % (ref 22–41)
MCH RBC QN AUTO: 32.1 PG (ref 27–33)
MCHC RBC AUTO-ENTMCNC: 34 G/DL (ref 33.7–35.3)
MCV RBC AUTO: 94.3 FL (ref 81.4–97.8)
MONOCYTES # BLD AUTO: 0.53 K/UL (ref 0–0.85)
MONOCYTES NFR BLD AUTO: 7.4 % (ref 0–13.4)
NEUTROPHILS # BLD AUTO: 3.96 K/UL (ref 1.82–7.42)
NEUTROPHILS NFR BLD: 55.4 % (ref 44–72)
NRBC # BLD AUTO: 0 K/UL
NRBC BLD AUTO-RTO: 0 /100 WBC
PLATELET # BLD AUTO: 173 K/UL (ref 164–446)
PMV BLD AUTO: 11.1 FL (ref 9–12.9)
POTASSIUM SERPL-SCNC: 3.8 MMOL/L (ref 3.6–5.5)
RBC # BLD AUTO: 4.95 M/UL (ref 4.7–6.1)
SODIUM SERPL-SCNC: 139 MMOL/L (ref 135–145)
WBC # BLD AUTO: 7.2 K/UL (ref 4.8–10.8)

## 2017-05-09 PROCEDURE — A9270 NON-COVERED ITEM OR SERVICE: HCPCS | Performed by: HOSPITALIST

## 2017-05-09 PROCEDURE — 700102 HCHG RX REV CODE 250 W/ 637 OVERRIDE(OP): Performed by: HOSPITALIST

## 2017-05-09 PROCEDURE — 700111 HCHG RX REV CODE 636 W/ 250 OVERRIDE (IP): Performed by: HOSPITALIST

## 2017-05-09 PROCEDURE — 99233 SBSQ HOSP IP/OBS HIGH 50: CPT | Performed by: HOSPITALIST

## 2017-05-09 PROCEDURE — 85025 COMPLETE CBC W/AUTO DIFF WBC: CPT

## 2017-05-09 PROCEDURE — 80048 BASIC METABOLIC PNL TOTAL CA: CPT

## 2017-05-09 PROCEDURE — 770020 HCHG ROOM/CARE - TELE (206)

## 2017-05-09 PROCEDURE — 36415 COLL VENOUS BLD VENIPUNCTURE: CPT

## 2017-05-09 PROCEDURE — 700105 HCHG RX REV CODE 258: Performed by: HOSPITALIST

## 2017-05-09 RX ORDER — FUROSEMIDE 10 MG/ML
40 INJECTION INTRAMUSCULAR; INTRAVENOUS ONCE
Status: COMPLETED | OUTPATIENT
Start: 2017-05-09 | End: 2017-05-09

## 2017-05-09 RX ORDER — POTASSIUM CHLORIDE 20 MEQ/1
40 TABLET, EXTENDED RELEASE ORAL DAILY
Status: DISCONTINUED | OUTPATIENT
Start: 2017-05-09 | End: 2017-05-10 | Stop reason: HOSPADM

## 2017-05-09 RX ORDER — POTASSIUM CHLORIDE 1.5 G/1.58G
40 POWDER, FOR SOLUTION ORAL 2 TIMES DAILY
Status: ON HOLD | COMMUNITY
End: 2017-05-10

## 2017-05-09 RX ORDER — MAGNESIUM OXIDE 400 MG/1
500 TABLET ORAL DAILY
COMMUNITY

## 2017-05-09 RX ADMIN — CETIRIZINE HYDROCHLORIDE 10 MG: 10 TABLET, FILM COATED ORAL at 07:54

## 2017-05-09 RX ADMIN — FUROSEMIDE 40 MG: 10 INJECTION, SOLUTION INTRAVENOUS at 10:35

## 2017-05-09 RX ADMIN — LISINOPRIL 40 MG: 20 TABLET ORAL at 07:54

## 2017-05-09 RX ADMIN — HYDROCODONE BITARTRATE AND ACETAMINOPHEN 1 TABLET: 10; 325 TABLET ORAL at 05:30

## 2017-05-09 RX ADMIN — HYDROCODONE BITARTRATE AND ACETAMINOPHEN 1 TABLET: 10; 325 TABLET ORAL at 14:08

## 2017-05-09 RX ADMIN — HYDROCODONE BITARTRATE AND ACETAMINOPHEN 1 TABLET: 10; 325 TABLET ORAL at 00:46

## 2017-05-09 RX ADMIN — FUROSEMIDE 5 MG/HR: 10 INJECTION, SOLUTION INTRAMUSCULAR; INTRAVENOUS at 12:16

## 2017-05-09 RX ADMIN — LISINOPRIL 40 MG: 20 TABLET ORAL at 21:30

## 2017-05-09 RX ADMIN — HYDRALAZINE HYDROCHLORIDE 50 MG: 50 TABLET ORAL at 07:53

## 2017-05-09 RX ADMIN — HYDRALAZINE HYDROCHLORIDE 50 MG: 50 TABLET ORAL at 04:17

## 2017-05-09 RX ADMIN — HYDRALAZINE HYDROCHLORIDE 50 MG: 50 TABLET ORAL at 14:08

## 2017-05-09 RX ADMIN — MAGNESIUM GLUCONATE 500 MG ORAL TABLET 400 MG: 500 TABLET ORAL at 14:07

## 2017-05-09 RX ADMIN — HYDROCODONE BITARTRATE AND ACETAMINOPHEN 1 TABLET: 10; 325 TABLET ORAL at 10:35

## 2017-05-09 RX ADMIN — HYDROCODONE BITARTRATE AND ACETAMINOPHEN 1 TABLET: 10; 325 TABLET ORAL at 17:25

## 2017-05-09 RX ADMIN — HYDROCODONE BITARTRATE AND ACETAMINOPHEN 1 TABLET: 10; 325 TABLET ORAL at 21:30

## 2017-05-09 RX ADMIN — HYDRALAZINE HYDROCHLORIDE 50 MG: 50 TABLET ORAL at 22:26

## 2017-05-09 RX ADMIN — METOPROLOL TARTRATE 50 MG: 50 TABLET, FILM COATED ORAL at 00:46

## 2017-05-09 RX ADMIN — METOPROLOL TARTRATE 50 MG: 50 TABLET, FILM COATED ORAL at 21:30

## 2017-05-09 RX ADMIN — LISINOPRIL 40 MG: 20 TABLET ORAL at 00:46

## 2017-05-09 RX ADMIN — POTASSIUM CHLORIDE 40 MEQ: 1500 TABLET, EXTENDED RELEASE ORAL at 14:07

## 2017-05-09 RX ADMIN — METOPROLOL TARTRATE 50 MG: 50 TABLET, FILM COATED ORAL at 07:54

## 2017-05-09 RX ADMIN — FUROSEMIDE 40 MG: 10 INJECTION, SOLUTION INTRAMUSCULAR; INTRAVENOUS at 05:30

## 2017-05-09 RX ADMIN — DIAZEPAM 10 MG: 5 TABLET ORAL at 22:26

## 2017-05-09 ASSESSMENT — PAIN SCALES - GENERAL
PAINLEVEL_OUTOF10: 0
PAINLEVEL_OUTOF10: 8
PAINLEVEL_OUTOF10: 10
PAINLEVEL_OUTOF10: 0
PAINLEVEL_OUTOF10: 10
PAINLEVEL_OUTOF10: 6

## 2017-05-09 ASSESSMENT — ENCOUNTER SYMPTOMS
VOMITING: 0
DIZZINESS: 0
COUGH: 0
FEVER: 0
CONSTIPATION: 0
PALPITATIONS: 0
NAUSEA: 0
ABDOMINAL PAIN: 0
FOCAL WEAKNESS: 0
DIARRHEA: 0
MYALGIAS: 0
ORTHOPNEA: 1
SHORTNESS OF BREATH: 1
HEADACHES: 0
WEAKNESS: 0
CHILLS: 0

## 2017-05-09 ASSESSMENT — LIFESTYLE VARIABLES
PACK_YEARS: 30
EVER_SMOKED: YES
ALCOHOL_USE: NO

## 2017-05-09 NOTE — H&P
DATE OF ADMISSION:  05/08/2017    PRIMARY CARE PROVIDER:  Aaron Chan MD    Cardiologist has been seen by [facility] inpatient.    CHIEF COMPLAINT:  Shortness of breath and orthopnea.    HISTORY OF PRESENT ILLNESS:  A 47-year-old male.  Patient has a history of   hypertensive heart disease and heart failure with preserved ejection fraction.    Patient was recently admitted to the hospital for accelerated hypertension,   he was seen by cardiology, prescribed a new medication regimen including   Norvasc and he returned home on 05/03/2017.  The patient has been taking these   medications as prescribed including Norvasc and Lasix.  He has developed   progressive generalized swelling in his extremities.  This has been associated   with shortness of breath and a sensation that he is drowning with food in his   lungs, this is worse when he lies down and better when he sits up.  Patient   continues to urinate normally, he has not noted any changes in his urine,   character any foamy urine.  No chest pain.  No palpitations.  Patient   estimates that he has gained 10 pounds of fluid over the past week.  His lower   extremities are painful with the swelling.    REVIEW OF SYSTEMS:  Comprehensive review of systems was performed.  All   pertinent positives and negatives are described in the HPI, all other systems   reviewed and are negative.    PAST MEDICAL HISTORY:  1.  Hypertension.  2.  History of heart failure with preserved ejection fraction.    SOCIAL HISTORY:  He quit smoking in December.  He drinks alcohol occasionally.    He owns a carpet cleaning business.    FAMILY HISTORY:  Father had hypertension.    ALLERGIES:  NAPROXEN, OLMESARTAN, AND HYDROCHLOROTHIAZIDE.    MEDICATIONS:  Zyrtec 10 mg daily, Valium 10 mg twice a day as needed for   anxiety, Cardura 2 mg at bedtime, Lasix 40 mg daily, hydralazine 50 mg 3 times   a day, Norco 10/325 one to two tablets every 4 hours, lisinopril 40 mg twice   daily,  metoprolol 50 mg twice daily, oxycodone 30 mg every 4 hours as needed   for pain.    PHYSICAL EXAMINATION:  VITAL SIGNS:  Temperature 35.9, blood pressure 127/77, pulse 100, respirations   15, saturating 96% on 3 liters by nasal cannula.  GENERAL:  The patient is well developed, well-nourished, no apparent distress.  EYES:  Pupils equal, round and reactive.  Anicteric sclerae.  Moist   conjunctivae with no lid lag.  HEENT:  Normocephalic, atraumatic.  Mucous membranes are moist.  Oropharynx is   clear.  NECK:  Trachea midline, supple.  No thyromegaly.  LUNGS:  Normal respiratory effort, bibasilar crackles.  No wheezing.  CARDIOVASCULAR:  Regular rate and rhythm.  No murmurs, rubs or gallops.  PMI   is nondisplaced.  ABDOMEN:  Distended, soft, nontender.  No masses or hepatosplenomegaly.  EXTREMITIES:  No clubbing.  No cyanosis.  Patient's hands and feet are   swollen, 1+ edema to the knees bilaterally.  SKIN:  No rash.  No petechiae.    LABORATORY DATA:  White blood cell count 7.2, hemoglobin 15.9, platelets 173.    Sodium 139, potassium 3.8, BUN 18, creatinine 1.26.    IMAGING:  Chest x-ray, cardiomegaly with bibasilar atelectasis.    ASSESSMENT AND PLAN:  A 47-year-old male with history of difficult to control   hypertension and hypertensive heart disease, presents with volume overload.  1.  Congestive heart failure.  Previously, the patient's ejection fraction has   been preserved and his CHF symptoms have been attributed to hypertensive   heart disease.  Patient does continue to retain a significant quantity of   fluid.  He is tachycardic on today's exam.  I am going to repeat an   echocardiogram.  He was recently started on Norvasc, which can certainly cause   peripheral edema.  We will try to control his blood pressure without   this medication.  Aggressive IV diuresis.  Monitor I's and O's.  Monitor his   electrolytes.  2.  Hypertension.  We will continue his outpatient medications with the   exception of  Norvasc.  3.  Acute respiratory failure, diuresis, wean O2 as tolerated.  4.  Prophylaxis.  Heparin, bowel regimen.  5.  Full code.    Case was discussed with emergency room physician, Dr. Monae.  I have   reviewed the above studies including the chest x-ray showing cardiomegaly   myself.  I expect the patient to remain in the hospital for greater than 2   midnights.       ____________________________________     MD ROBBIE COFFMAN / NTS    DD:  05/09/2017 04:58:42  DT:  05/09/2017 07:05:49    D#:  [id number]  Job#:  [id number]

## 2017-05-09 NOTE — ED NOTES
"Pt comes in via ems due to SOB. Pt states its hard to breath and chest pain. Pt states SOB and chest pain comes together. Pt stopped smoking in December. Pt also has hx of \"aortic tear but no surgery done\". Pt AAOx4, speaking in complete sentences. Pt states it feels like someone is sitting on my chest. EMS gave NTG to relief chest pain and pt currently states no chest pain. Denies n/v. Pt states \"taking extra lasix today at 5pm\"  "

## 2017-05-09 NOTE — DIETARY
NUTRITION SERVICES: BMI - Pt with BMI >40 (=40.82). Weight loss counseling not appropriate in acute care setting. RECOMMEND - Referral to outpatient nutrition services for weight management after D/C.

## 2017-05-09 NOTE — ASSESSMENT & PLAN NOTE
Careful blood pressure control  Titrate PRN  SBP goal less than 140 mmHg  DBP goal less than 90 mmHg  PRN and antihypertensives to titrate by hospitalist towards goal  Most recent Blood Pressure: 129/80 mmHg

## 2017-05-09 NOTE — ASSESSMENT & PLAN NOTE
As of 05/09/2017    With acute decompensation    EF 55%    Grade II diastolic dysfunction    NYHA 4    Stage C    Careful diuresis with diuretic infusion     BP control

## 2017-05-09 NOTE — CARE PLAN
Problem: Communication  Goal: The ability to communicate needs accurately and effectively will improve  Outcome: PROGRESSING AS EXPECTED  Pt. Is able to express comfort needs effectively. Pt. Denies pain or discomfort at this time and is able to vocalize wants and needs.     Problem: Respiratory:  Goal: Respiratory status will improve  Outcome: PROGRESSING AS EXPECTED  Pt. Lung sounds are clear to auscultation. No current c/o SOB. Unlabored breathing and on room air.

## 2017-05-09 NOTE — PROGRESS NOTES
Assumed care at 0000, bedside report received from ER RN. Patient is AOx4 with no signs of distress. Pt. Is sinus rhythm on the monitor. Initial assessment completed, orders reviewed, call light within reach, and hourly rounding in place. POC addressed with patient, no additional questions at this time.
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
## 2017-05-09 NOTE — RESPIRATORY CARE
COPD EDUCATION by COPD CLINICAL EDUCATOR  5/9/2017 at 7:23 AM by Radha Feliciano     Patient reviewed by COPD education team. Patient does not qualify for COPD program.

## 2017-05-09 NOTE — PROGRESS NOTES
2 RN Skin check done with Amanda MAIER    Skin Intact and blanching. Pt. Sunburned. Slight bruising on upper and lower extremities.

## 2017-05-09 NOTE — PROGRESS NOTES
Assumed care of patient at 0715. Report received from night RN. Assessment completed and medications administered per MAR.  A&Ox4. No complaints of pain, numbness or tingling. Patient resting comfortably in bed with bed in lowest position and locked.  Call light within reach. POC discussed with pt, all questions answered at this time.

## 2017-05-09 NOTE — ED NOTES
"Pt ambulatory to restroom. States medication is working well. States he can breathe easier and \"My hands feel less swollen\"  "

## 2017-05-09 NOTE — PROGRESS NOTES
Hospital Medicine ICU Interval Note    Date of Service: 2017    Chief Complaint  47 y.o.-year-old male admitted 2017 with acute on chronic HFpEF    Interval Problem Update   - discussed with patient at length. Questions answered. He is anxious to get home because he is out of work, advised that we should at least dry him out here prior to discharge. SOB with exertion. Feels like he has gained a lot of weight since December and 4 pant sizes.     Consultants/Specialty  None    Disposition  Home when drier        Review of Systems   Constitutional: Positive for malaise/fatigue. Negative for fever and chills.   Respiratory: Positive for shortness of breath. Negative for cough.    Cardiovascular: Positive for orthopnea and leg swelling. Negative for chest pain and palpitations.   Gastrointestinal: Negative for nausea, vomiting, abdominal pain, diarrhea and constipation.   Genitourinary: Negative for dysuria.   Musculoskeletal: Negative for myalgias.   Skin: Negative for itching.   Neurological: Negative for dizziness, focal weakness, weakness and headaches.   All other systems reviewed and are negative.     Physical Exam  Laboratory/Imaging   Hemodynamics  Temp (24hrs), Av.3 °C (97.4 °F), Min:35.9 °C (96.7 °F), Max:36.6 °C (97.8 °F)   Temperature: 36.2 °C (97.2 °F)  Pulse  Av.1  Min: 69  Max: 103 Heart Rate (Monitored): (!) 105  Blood Pressure: 129/80 mmHg, NIBP: 130/77 mmHg      Respiratory      Respiration: 16, Pulse Oximetry: 97 %        RUL Breath Sounds: Clear, RML Breath Sounds: Clear, RLL Breath Sounds: Clear, JAYME Breath Sounds: Clear, LLL Breath Sounds: Clear    Fluids       Nutrition  Orders Placed This Encounter   Procedures   • Diet Order     Standing Status: Standing      Number of Occurrences: 1      Standing Expiration Date:      Order Specific Question:  Diet:     Answer:  Cardiac [6]     Physical Exam   Constitutional: He is oriented to person, place, and time. He appears  well-developed and well-nourished.   HENT:   Head: Normocephalic and atraumatic.   Mouth/Throat: Oropharynx is clear and moist.   Eyes: Conjunctivae and EOM are normal. Pupils are equal, round, and reactive to light. No scleral icterus.   Neck: Normal range of motion. Neck supple. No tracheal deviation present. No thyromegaly present.   Cardiovascular: Normal rate, regular rhythm, normal heart sounds and intact distal pulses.    No murmur heard.  Pulmonary/Chest: Effort normal and breath sounds normal. No respiratory distress. He has no wheezes.   Decreased BS at base, no crackles   Abdominal: Soft. Bowel sounds are normal. He exhibits no distension. There is no tenderness.   Musculoskeletal: Normal range of motion. He exhibits edema (2+ to thigh). He exhibits no tenderness.   Lymphadenopathy:     He has no cervical adenopathy.        Right: No supraclavicular adenopathy present.        Left: No supraclavicular adenopathy present.   Neurological: He is alert and oriented to person, place, and time. No cranial nerve deficit.   Skin: Skin is warm and dry.   Vitals reviewed.      Recent Labs      05/08/17 2037 05/09/17   0202   WBC  8.2  7.2   RBC  4.90  4.95   HEMOGLOBIN  16.1  15.9   HEMATOCRIT  45.5  46.7   MCV  92.9  94.3   MCH  32.9  32.1   MCHC  35.4*  34.0   RDW  43.8  45.4   PLATELETCT  174  173   MPV  11.1  11.1     Recent Labs      05/08/17 2037 05/09/17   0202   SODIUM  139  139   POTASSIUM  3.8  3.8   CHLORIDE  106  106   CO2  22  22   GLUCOSE  100*  97   BUN  18  18   CREATININE  1.50*  1.26   CALCIUM  9.1  8.9         Recent Labs      05/08/17 2037   BNPBTYPENAT  31              Assessment/Plan     * Acute on chronic diastolic (congestive) heart failure (CMS-HCC) (present on admission)  Assessment & Plan  As of 05/09/2017    With acute decompensation    EF 55%    Grade II diastolic dysfunction    NYHA 4    Stage C    Careful diuresis with diuretic infusion     BP control    Hypertension,  uncontrolled (present on admission)  Assessment & Plan  Careful blood pressure control  Titrate PRN  SBP goal less than 140 mmHg  DBP goal less than 90 mmHg  PRN and antihypertensives to titrate by hospitalist towards goal  Most recent Blood Pressure: 129/80 mmHg    Morbid obesity due to excess calories (CMS-HCC) (present on admission)  Assessment & Plan  Outpatient weight loss    HTN (hypertension) (present on admission)  Assessment & Plan  As above    Labs reviewed and Medications reviewed        DVT Prophylaxis: Heparin    Ulcer prophylaxis: Not indicated

## 2017-05-10 ENCOUNTER — APPOINTMENT (OUTPATIENT)
Dept: MEDICAL GROUP | Facility: MEDICAL CENTER | Age: 47
End: 2017-05-10
Payer: COMMERCIAL

## 2017-05-10 VITALS
OXYGEN SATURATION: 94 % | DIASTOLIC BLOOD PRESSURE: 94 MMHG | TEMPERATURE: 97.7 F | RESPIRATION RATE: 16 BRPM | HEART RATE: 84 BPM | HEIGHT: 71 IN | SYSTOLIC BLOOD PRESSURE: 135 MMHG | BODY MASS INDEX: 43.06 KG/M2 | WEIGHT: 307.54 LBS

## 2017-05-10 PROBLEM — I50.32 CHRONIC DIASTOLIC CONGESTIVE HEART FAILURE, NYHA CLASS 2 (HCC): Status: RESOLVED | Noted: 2017-04-26 | Resolved: 2017-05-10

## 2017-05-10 PROBLEM — I50.33 ACUTE ON CHRONIC DIASTOLIC (CONGESTIVE) HEART FAILURE (HCC): Chronic | Status: RESOLVED | Noted: 2017-04-26 | Resolved: 2017-05-10

## 2017-05-10 PROBLEM — I10 HYPERTENSION, UNCONTROLLED: Status: RESOLVED | Noted: 2017-05-01 | Resolved: 2017-05-10

## 2017-05-10 LAB
ANION GAP SERPL CALC-SCNC: 5 MMOL/L (ref 0–11.9)
BASOPHILS # BLD AUTO: 1 % (ref 0–1.8)
BASOPHILS # BLD: 0.07 K/UL (ref 0–0.12)
BUN SERPL-MCNC: 21 MG/DL (ref 8–22)
CALCIUM SERPL-MCNC: 9.2 MG/DL (ref 8.5–10.5)
CHLORIDE SERPL-SCNC: 103 MMOL/L (ref 96–112)
CO2 SERPL-SCNC: 30 MMOL/L (ref 20–33)
CREAT SERPL-MCNC: 1.28 MG/DL (ref 0.5–1.4)
EOSINOPHIL # BLD AUTO: 0.45 K/UL (ref 0–0.51)
EOSINOPHIL NFR BLD: 6.3 % (ref 0–6.9)
ERYTHROCYTE [DISTWIDTH] IN BLOOD BY AUTOMATED COUNT: 45.7 FL (ref 35.9–50)
GFR SERPL CREATININE-BSD FRML MDRD: 60 ML/MIN/1.73 M 2
GLUCOSE SERPL-MCNC: 102 MG/DL (ref 65–99)
HCT VFR BLD AUTO: 48.7 % (ref 42–52)
HGB BLD-MCNC: 16.5 G/DL (ref 14–18)
IMM GRANULOCYTES # BLD AUTO: 0.05 K/UL (ref 0–0.11)
IMM GRANULOCYTES NFR BLD AUTO: 0.7 % (ref 0–0.9)
LV EJECT FRACT  99904: 75
LYMPHOCYTES # BLD AUTO: 2.14 K/UL (ref 1–4.8)
LYMPHOCYTES NFR BLD: 30.2 % (ref 22–41)
MCH RBC QN AUTO: 32.2 PG (ref 27–33)
MCHC RBC AUTO-ENTMCNC: 33.9 G/DL (ref 33.7–35.3)
MCV RBC AUTO: 95.1 FL (ref 81.4–97.8)
MONOCYTES # BLD AUTO: 0.5 K/UL (ref 0–0.85)
MONOCYTES NFR BLD AUTO: 7.1 % (ref 0–13.4)
NEUTROPHILS # BLD AUTO: 3.88 K/UL (ref 1.82–7.42)
NEUTROPHILS NFR BLD: 54.7 % (ref 44–72)
NRBC # BLD AUTO: 0 K/UL
NRBC BLD AUTO-RTO: 0 /100 WBC
PLATELET # BLD AUTO: 176 K/UL (ref 164–446)
PMV BLD AUTO: 11.1 FL (ref 9–12.9)
POTASSIUM SERPL-SCNC: 4.2 MMOL/L (ref 3.6–5.5)
RBC # BLD AUTO: 5.12 M/UL (ref 4.7–6.1)
SODIUM SERPL-SCNC: 138 MMOL/L (ref 135–145)
WBC # BLD AUTO: 7.1 K/UL (ref 4.8–10.8)

## 2017-05-10 PROCEDURE — 36415 COLL VENOUS BLD VENIPUNCTURE: CPT

## 2017-05-10 PROCEDURE — 93306 TTE W/DOPPLER COMPLETE: CPT

## 2017-05-10 PROCEDURE — 85025 COMPLETE CBC W/AUTO DIFF WBC: CPT

## 2017-05-10 PROCEDURE — 93306 TTE W/DOPPLER COMPLETE: CPT | Mod: 26 | Performed by: INTERNAL MEDICINE

## 2017-05-10 PROCEDURE — 99239 HOSP IP/OBS DSCHRG MGMT >30: CPT | Performed by: HOSPITALIST

## 2017-05-10 PROCEDURE — 700111 HCHG RX REV CODE 636 W/ 250 OVERRIDE (IP): Performed by: HOSPITALIST

## 2017-05-10 PROCEDURE — 700102 HCHG RX REV CODE 250 W/ 637 OVERRIDE(OP): Performed by: HOSPITALIST

## 2017-05-10 PROCEDURE — A9270 NON-COVERED ITEM OR SERVICE: HCPCS | Performed by: HOSPITALIST

## 2017-05-10 PROCEDURE — 700105 HCHG RX REV CODE 258: Performed by: HOSPITALIST

## 2017-05-10 PROCEDURE — 80048 BASIC METABOLIC PNL TOTAL CA: CPT

## 2017-05-10 RX ORDER — POTASSIUM CHLORIDE 1.5 G/1.58G
40 POWDER, FOR SOLUTION ORAL 2 TIMES DAILY
Qty: 60 PACKET | Refills: 2 | Status: SHIPPED | OUTPATIENT
Start: 2017-05-10

## 2017-05-10 RX ORDER — FUROSEMIDE 40 MG/1
40 TABLET ORAL DAILY
Qty: 45 TAB | Refills: 2 | Status: SHIPPED | OUTPATIENT
Start: 2017-05-10 | End: 2017-06-14 | Stop reason: SDUPTHER

## 2017-05-10 RX ADMIN — HYDROCODONE BITARTRATE AND ACETAMINOPHEN 1 TABLET: 10; 325 TABLET ORAL at 09:32

## 2017-05-10 RX ADMIN — HYDRALAZINE HYDROCHLORIDE 50 MG: 50 TABLET ORAL at 13:03

## 2017-05-10 RX ADMIN — LISINOPRIL 40 MG: 20 TABLET ORAL at 07:12

## 2017-05-10 RX ADMIN — CETIRIZINE HYDROCHLORIDE 10 MG: 10 TABLET, FILM COATED ORAL at 07:13

## 2017-05-10 RX ADMIN — HYDRALAZINE HYDROCHLORIDE 50 MG: 50 TABLET ORAL at 09:32

## 2017-05-10 RX ADMIN — FUROSEMIDE 5 MG/HR: 10 INJECTION, SOLUTION INTRAMUSCULAR; INTRAVENOUS at 04:06

## 2017-05-10 RX ADMIN — HYDROCODONE BITARTRATE AND ACETAMINOPHEN 1 TABLET: 10; 325 TABLET ORAL at 13:03

## 2017-05-10 RX ADMIN — MAGNESIUM GLUCONATE 500 MG ORAL TABLET 400 MG: 500 TABLET ORAL at 07:12

## 2017-05-10 RX ADMIN — METOPROLOL TARTRATE 50 MG: 50 TABLET, FILM COATED ORAL at 07:12

## 2017-05-10 RX ADMIN — POTASSIUM CHLORIDE 40 MEQ: 1500 TABLET, EXTENDED RELEASE ORAL at 07:12

## 2017-05-10 RX ADMIN — HYDROCODONE BITARTRATE AND ACETAMINOPHEN 1 TABLET: 10; 325 TABLET ORAL at 06:01

## 2017-05-10 RX ADMIN — HYDROCODONE BITARTRATE AND ACETAMINOPHEN 1 TABLET: 10; 325 TABLET ORAL at 02:11

## 2017-05-10 ASSESSMENT — PAIN SCALES - GENERAL
PAINLEVEL_OUTOF10: 4
PAINLEVEL_OUTOF10: 8
PAINLEVEL_OUTOF10: 6
PAINLEVEL_OUTOF10: 0

## 2017-05-10 NOTE — PROGRESS NOTES
Bedside report received. POC discussed with pt; all questions answered at this time. Pt anxious to go home.

## 2017-05-10 NOTE — CARE PLAN
Problem: Communication  Goal: The ability to communicate needs accurately and effectively will improve  Outcome: PROGRESSING AS EXPECTED  POC discussed with pt. All medications explained. All questions answered.     Problem: Infection  Goal: Will remain free from infection  Outcome: PROGRESSING AS EXPECTED  Vital signs stable. Pt and family educated on proper hand hygiene. Scrub access port for at least 15 seconds.

## 2017-05-10 NOTE — PROGRESS NOTES
Assumed care of patient at 0715. Report received from night RN. Assessment completed and medications administered per MAR. A&Ox4. Vital signs stable. Pt does not complain of pain, numbness or tingling. Resting comfortably in bed. Bed locked and in lowest position, call light within reach. POC discussed with patient and all questions answered at this time.

## 2017-05-10 NOTE — PROGRESS NOTES
Pt dc'd to home. IV and monitor removed; monitor room notified. Pt left unit via walking with self; pt transport refused. Personal belongings with pt when leaving unit. Pt given discharge instructions prior to leaving unit including when to visit with physician; verbalizes understanding. Copy of discharge instructions with pt and in the chart.

## 2017-05-10 NOTE — DISCHARGE INSTRUCTIONS
Discharge Instructions    Discharged to home by car with relative. Discharged via wheelchair, hospital escort: Yes.  Special equipment needed: Not Applicable    Be sure to schedule a follow-up appointment with your primary care doctor or any specialists as instructed.     Discharge Plan:   Smoking Cessation Offered: Patient Refused  Influenza Vaccine Indication: Patient Refuses (allergic)    I understand that a diet low in cholesterol, fat, and sodium is recommended for good health. Unless I have been given specific instructions below for another diet, I accept this instruction as my diet prescription.   Other diet: Heart Healthy, low sodium    Special Instructions:   HF Patient Discharge Instructions  · Monitor your weight daily, and maintain a weight chart, to track your weight changes.   · Activity as tolerated, unless your Doctor has ordered otherwise. Other activity order: As tolerated.  · Follow a low fat, low cholesterol, low salt diet unless instructed otherwise by your Doctor. Read the labels on the back of food products and track your intake of fat, cholesterol and salt.   · Fluid Restriction No. If a Fluid Restriction has been ordered by your Doctor, measure fluids with a measuring cup to ensure that you are not exceeding the restriction.   · No smoking.  · Oxygen No. If your Doctor has ordered that you wear Oxygen at home, it is important to wear it as ordered.  · Did you receive an explanation from staff on the importance of taking each of your medications and why it is necessary to keep taking them unless your doctor says to stop? Yes  · Were all of your questions answered about how to manage your heart failure and what to do if you have increased signs and symptoms after you go home? Yes  · Do you feel like your heart failure care team involved you in the care treatment plan and allowed you to make decisions regarding your care while in the hospital and addressed any discharge needs you might have?  Yes    See the educational handout provided at discharge for more information on monitoring your daily weight, activity and diet. This also explains more about Heart Failure, symptoms of a flare-up and some of the tests that you have undergone.     Warning Signs of a Flare-Up include:  · Swelling in the ankles or lower legs.  · Shortness of breath, while at rest, or while doing normal activities.   · Shortness of breath at night when in bed, or coughing in bed.   · Requiring more pillows to sleep at night, or needing to sit up at night to sleep.  · Feeling weak, dizzy or fatigued.     When to call your Doctor:  · Call [facility] seven days a week from 8:00 a.m. to 8:00 p.m. for medical questions [phone].  · Call your Primary Care Physician or Cardiologist if:   1. You experience any pain radiating to your jaw or neck.  2. You have any difficulty breathing.  3. You experience weight gain of 3 lbs in a day or 5 lbs in a week.   4. You feel any palpitations or irregular heartbeats.  5. You become dizzy or lose consciousness.   If you have had an angiogram or had a pacemaker or AICD placed, and experience:  1. Bleeding, drainage or swelling at the surgical / puncture site.  2. Fever greater than 100.0 F  3. Shock from internal defibrillator.  4. Cool and / or numb extremities.    No   · Is patient discharged on Warfarin / Coumadin?       · Is patient Post Blood Transfusion?  No    Depression / Suicide Risk    As you are discharged from this [facility], it is important to learn how to keep safe from harming yourself.    Recognize the warning signs:  · Abrupt changes in personality, positive or negative- including increase in energy   · Giving away possessions  · Change in eating patterns- significant weight changes-  positive or negative  · Change in sleeping patterns- unable to sleep or sleeping all the time   · Unwillingness or inability to communicate  · Depression  · Unusual sadness,  discouragement and loneliness  · Talk of wanting to die  · Neglect of personal appearance   · Rebelliousness- reckless behavior  · Withdrawal from people/activities they love  · Confusion- inability to concentrate     If you or a loved one observes any of these behaviors or has concerns about self-harm, here's what you can do:  · Talk about it- your feelings and reasons for harming yourself  · Remove any means that you might use to hurt yourself (examples: pills, rope, extension cords, firearm)  · Get professional help from the community (Mental Health, Substance Abuse, psychological counseling)  · Do not be alone:Call your Safe Contact- someone whom you trust who will be there for you.  · Call your local CRISIS HOTLINE 219-9894 or 553-696-4594  · Call your local Children's Mobile Crisis Response Team [place] (838) 502-2540 or www.Edvisor.io  · Call the toll free National Suicide Prevention Hotlines   · National Suicide Prevention Lifeline 167-643-WQYO (6480)  · National Hope Line Network 800-SUICIDE (777-0566)    Heart-Healthy Eating Plan  Heart-healthy meal planning includes:  · Limiting unhealthy fats.  · Increasing healthy fats.  · Making other small dietary changes.  You may need to talk with your doctor or a diet specialist (dietitian) to create an eating plan that is right for you.  WHAT TYPES OF FAT SHOULD I CHOOSE?  · Choose healthy fats. These include olive oil and canola oil, flaxseeds, walnuts, almonds, and seeds.  · Eat more omega-3 fats. These include salmon, mackerel, sardines, tuna, flaxseed oil, and ground flaxseeds. Try to eat fish at least twice each week.  · Limit saturated fats.  ¨ Saturated fats are often found in animal products, such as meats, butter, and cream.  ¨ Plant sources of saturated fats include palm oil, palm kernel oil, and coconut oil.  · Avoid foods with partially hydrogenated oils in them. These include stick margarine, some tub margarines, cookies, crackers, and  "other baked goods. These contain trans fats.  WHAT GENERAL GUIDELINES DO I NEED TO FOLLOW?  · Check food labels carefully. Identify foods with trans fats or high amounts of saturated fat.  · Fill one half of your plate with vegetables and green salads. Eat 4-5 servings of vegetables per day. A serving of vegetables is:  ¨ 1 cup of raw leafy vegetables.  ¨ ½ cup of raw or cooked cut-up vegetables.  ¨ ½ cup of vegetable juice.  · Fill one fourth of your plate with whole grains. Look for the word \"whole\" as the first word in the ingredient list.  · Fill one fourth of your plate with lean protein foods.  · Eat 4-5 servings of fruit per day. A serving of fruit is:  ¨ One medium whole fruit.  ¨ ¼ cup of dried fruit.  ¨ ½ cup of fresh, frozen, or canned fruit.  ¨ ½ cup of 100% fruit juice.  · Eat more foods that contain soluble fiber. These include apples, broccoli, carrots, beans, peas, and barley. Try to get 20-30 g of fiber per day.  · Eat more home-cooked food. Eat less restaurant, buffet, and fast food.  · Limit or avoid alcohol.  · Limit foods high in starch and sugar.  · Avoid fried foods.  · Avoid frying your food. Try baking, boiling, grilling, or broiling it instead. You can also reduce fat by:  ¨ Removing the skin from poultry.  ¨ Removing all visible fats from meats.  ¨ Skimming the fat off of stews, soups, and gravies before serving them.  ¨ Steaming vegetables in water or broth.  · Lose weight if you are overweight.  · Eat 4-5 servings of nuts, legumes, and seeds per week:  ¨ One serving of dried beans or legumes equals ½ cup after being cooked.  ¨ One serving of nuts equals 1½ ounces.  ¨ One serving of seeds equals ½ ounce or one tablespoon.  · You may need to keep track of how much salt or sodium you eat. This is especially true if you have high blood pressure. Talk with your doctor or dietitian to get more information.  WHAT FOODS CAN I EAT?  Grains  Breads, including Nigerien, white, ciara, wheat, raisin, " rye, oatmeal, and Italian. Tortillas that are neither fried nor made with lard or trans fat. Low-fat rolls, including hotdog and hamburger buns and English muffins. Biscuits. Muffins. Waffles. Pancakes. Light popcorn. Whole-grain cereals. Flatbread. Chandrika toast. Pretzels. Breadsticks. Rusks. Low-fat snacks. Low-fat crackers, including oyster, saltine, matzo, federico, animal, and rye. Rice and pasta, including brown rice and pastas that are made with whole wheat.   Vegetables  All vegetables.   Fruits  All fruits, but limit coconut.  Meats and Other Protein Sources  Lean, well-trimmed beef, veal, pork, and lamb. Chicken and turkey without skin. All fish and shellfish. Wild duck, rabbit, pheasant, and venison. Egg whites or low-cholesterol egg substitutes. Dried beans, peas, lentils, and tofu. Seeds and most nuts.  Dairy  Low-fat or nonfat cheeses, including ricotta, string, and mozzarella. Skim or 1% milk that is liquid, powdered, or evaporated. Buttermilk that is made with low-fat milk. Nonfat or low-fat yogurt.  Beverages  Mineral water. Diet carbonated beverages.  Sweets and Desserts  Sherbets and fruit ices. Honey, jam, marmalade, jelly, and syrups. Meringues and gelatins. Pure sugar candy, such as hard candy, jelly beans, gumdrops, mints, marshmallows, and small amounts of dark chocolate. Jared food cake.  Eat all sweets and desserts in moderation.  Fats and Oils  Nonhydrogenated (trans-free) margarines. Vegetable oils, including soybean, sesame, sunflower, olive, peanut, safflower, corn, canola, and cottonseed. Salad dressings or mayonnaise made with a vegetable oil. Limit added fats and oils that you use for cooking, baking, salads, and as spreads.  Other  Cocoa powder. Coffee and tea. All seasonings and condiments.  The items listed above may not be a complete list of recommended foods or beverages. Contact your dietitian for more options.  WHAT FOODS ARE NOT RECOMMENDED?  Grains  Breads that are made with  saturated or trans fats, oils, or whole milk. Croissants. Butter rolls. Cheese breads. Sweet rolls. Donuts. Buttered popcorn. Chow mein noodles. High-fat crackers, such as cheese or butter crackers.  Meats and Other Protein Sources  Fatty meats, such as hotdogs, short ribs, sausage, spareribs, enriquez, rib eye roast or steak, and mutton. High-fat deli meats, such as salami and bologna. Caviar. Domestic duck and goose. Organ meats, such as kidney, liver, sweetbreads, and heart.  Dairy  Cream, sour cream, cream cheese, and creamed cottage cheese. Whole-milk cheeses, including blue (kinga), [place], Brie, Ritchie, American, Havarti, Swiss, cheddar, Camembert, and York. Whole or 2% milk that is liquid, evaporated, or condensed. Whole buttermilk. Cream sauce or high-fat cheese sauce. Yogurt that is made from whole milk.  Beverages  Regular sodas and juice drinks with added sugar.  Sweets and Desserts  Frosting. Pudding. Cookies. Cakes other than isaura food cake. Candy that has milk chocolate or white chocolate, hydrogenated fat, butter, coconut, or unknown ingredients. Buttered syrups. Full-fat ice cream or ice cream drinks.  Fats and Oils  Gravy that has suet, meat fat, or shortening. Cocoa butter, hydrogenated oils, palm oil, coconut oil, palm kernel oil. These can often be found in baked products, candy, fried foods, nondairy creamers, and whipped toppings. Solid fats and shortenings, including enriquez fat, salt pork, lard, and butter. Nondairy cream substitutes, such as coffee creamers and sour cream substitutes. Salad dressings that are made of unknown oils, cheese, or sour cream.  The items listed above may not be a complete list of foods and beverages to avoid. Contact your dietitian for more information.     This information is not intended to replace advice given to you by your health care provider. Make sure you discuss any questions you have with your health care provider.     Document Released: 06/18/2013  Document Revised: 01/08/2016 Document Reviewed: 06/11/2015  [employer] Interactive Patient Education ©2016 [employer]

## 2017-05-11 ENCOUNTER — PATIENT OUTREACH (OUTPATIENT)
Dept: HEALTH INFORMATION MANAGEMENT | Facility: OTHER | Age: 47
End: 2017-05-11

## 2017-05-11 NOTE — PROGRESS NOTES
05/11/2017 1122 - Discharge Outreach attempt - LM  05/11/2017 1633 - Discharge Outreach - reached patient and call completed.

## 2017-05-12 NOTE — DISCHARGE SUMMARY
DATE OF ADMISSION:  05/08/2017    DATE OF DISCHARGE:  05/10/2017    ADMITTING DIAGNOSES:  1.  Acute decompensated heart failure with preserved ejection fraction.  2.  Hypertension.  3.  Acute respiratory failure.    DISCHARGE DIAGNOSES:  1.  Acute decompensation of heart failure with preserved ejection fraction,   resolved, baseline.  2.  Hypertension.  3.  Hypertensive cardiomyopathy.  4.  Acute respiratory failure, resolved.    CONSULTATIONS:  None.    PROCEDURES:  None.    HISTORY OF PRESENTING ILLNESS AND HOSPITAL COURSE:  This is a 47-year-old   gentleman with hypertensive cardiomyopathy and diastolic dysfunction, who   comes in with fluid overload and shortness of breath.  He was admitted and   diuresed aggressively, had excellent diuresis and was able to be completely   weaned off his oxygen requirements.  He felt much better and was advised to   take his double his dose of Lasix for the next 3 days and then his home dose   of Lasix was increased from 40 mg to 40 mg in the morning and 20 mg in the   afternoon.  Therefore, with his admitting complaint resolved, he was able to   discharge to home in good and stable condition with close outpatient followup   with his primary care physician.    DISCHARGE MEDICATIONS:  Lasix 40 mg daily with an additional half tab in the   afternoon.  He is also to continue his Zyrtec 10 mg daily, Valium 10 mg twice   a day as needed for anxiety, doxazosin 2 mg at bedtime, hydralazine 50 mg 3   times daily, Norco 10/325 one to two tabs every 4 hours as needed for pain,   lisinopril 40 mg twice daily, magnesium oxide 400 mg daily, metoprolol 50 mg   twice daily, oxycodone immediate release 30 mg every 4 hours as needed for   moderate pain, and potassium chloride 20 mEq twice daily with his Lasix.    FOLLOWUP:  Follow up with his primary care preferably within 1-2 weeks from   the hospital discharge, Dr. Aaron Chan on 05/18/2017 at 2:40 p.m.    Total time of discharge, 35  ike.       ____________________________________     MD CATRINA Garrett    DD:  05/11/2017 06:35:15  DT:  05/11/2017 08:12:10    D#:  [id number]  Job#:  [id number]

## 2017-05-18 ENCOUNTER — OFFICE VISIT (OUTPATIENT)
Dept: MEDICAL GROUP | Facility: MEDICAL CENTER | Age: 47
End: 2017-05-18
Payer: COMMERCIAL

## 2017-05-18 VITALS
RESPIRATION RATE: 18 BRPM | DIASTOLIC BLOOD PRESSURE: 98 MMHG | BODY MASS INDEX: 42.98 KG/M2 | TEMPERATURE: 97.9 F | HEIGHT: 71 IN | HEART RATE: 113 BPM | OXYGEN SATURATION: 97 % | SYSTOLIC BLOOD PRESSURE: 150 MMHG | WEIGHT: 307 LBS

## 2017-05-18 DIAGNOSIS — I50.32 CHRONIC DIASTOLIC CONGESTIVE HEART FAILURE, NYHA CLASS 2 (HCC): ICD-10-CM

## 2017-05-18 DIAGNOSIS — I10 ESSENTIAL HYPERTENSION: ICD-10-CM

## 2017-05-18 DIAGNOSIS — G89.29 CHRONIC BILATERAL LOW BACK PAIN WITHOUT SCIATICA: ICD-10-CM

## 2017-05-18 DIAGNOSIS — I11.0 HYPERTENSIVE CARDIOMEGALY WITH HEART FAILURE (HCC): ICD-10-CM

## 2017-05-18 DIAGNOSIS — E66.01 MORBID OBESITY DUE TO EXCESS CALORIES (HCC): ICD-10-CM

## 2017-05-18 DIAGNOSIS — F51.01 PRIMARY INSOMNIA: ICD-10-CM

## 2017-05-18 DIAGNOSIS — M54.50 CHRONIC BILATERAL LOW BACK PAIN WITHOUT SCIATICA: ICD-10-CM

## 2017-05-18 PROBLEM — R06.00 DYSPNEA: Status: RESOLVED | Noted: 2017-04-26 | Resolved: 2017-05-18

## 2017-05-18 PROBLEM — D75.1 POLYCYTHEMIA: Status: RESOLVED | Noted: 2017-05-01 | Resolved: 2017-05-18

## 2017-05-18 PROCEDURE — 99215 OFFICE O/P EST HI 40 MIN: CPT | Performed by: FAMILY MEDICINE

## 2017-05-18 RX ORDER — METOPROLOL TARTRATE 50 MG/1
50 TABLET, FILM COATED ORAL 2 TIMES DAILY
Qty: 90 TAB | Refills: 0 | Status: SHIPPED | OUTPATIENT
Start: 2017-05-18 | End: 2017-08-07 | Stop reason: SDUPTHER

## 2017-05-18 RX ORDER — HYDROCODONE BITARTRATE AND ACETAMINOPHEN 10; 325 MG/1; MG/1
1 TABLET ORAL EVERY 4 HOURS PRN
Qty: 180 TAB | Refills: 0 | Status: SHIPPED | OUTPATIENT
Start: 2017-05-18 | End: 2017-06-17

## 2017-05-18 RX ORDER — ALPRAZOLAM 1 MG/1
1 TABLET ORAL NIGHTLY PRN
Qty: 90 TAB | Refills: 0 | Status: SHIPPED | OUTPATIENT
Start: 2017-05-18 | End: 2017-08-16 | Stop reason: SDUPTHER

## 2017-05-18 RX ORDER — LISINOPRIL 40 MG/1
40 TABLET ORAL 2 TIMES DAILY
Qty: 180 TAB | Refills: 1 | Status: SHIPPED | OUTPATIENT
Start: 2017-05-18 | End: 2017-12-30 | Stop reason: SDUPTHER

## 2017-05-18 RX ORDER — NITROGLYCERIN 0.3 MG/1
0.3 TABLET SUBLINGUAL SEE ADMIN INSTRUCTIONS
Qty: 100 TAB | Refills: 0 | Status: SHIPPED | OUTPATIENT
Start: 2017-05-18

## 2017-05-18 RX ORDER — DOXAZOSIN 2 MG/1
2 TABLET ORAL
Qty: 30 TAB | Refills: 0 | Status: SHIPPED | OUTPATIENT
Start: 2017-05-18 | End: 2017-07-11 | Stop reason: SDUPTHER

## 2017-05-18 RX ORDER — HYDRALAZINE HYDROCHLORIDE 50 MG/1
50 TABLET, FILM COATED ORAL 3 TIMES DAILY
Qty: 270 TAB | Refills: 1 | Status: SHIPPED | OUTPATIENT
Start: 2017-05-18

## 2017-05-18 NOTE — MR AVS SNAPSHOT
"        Giacomo Grandazeynep   2017 2:40 PM   Office Visit   MRN: [id number]    Department:  [place] [address]   Dept Phone:  [phone]    Description:  Male : 1970   Provider:  Aaron Chan M.D.           Reason for Visit     Follow-Up Hospitalized 2 weeks ago heart failure       Allergies as of 2017     Allergen Noted Reactions    Naproxen 2016   Rash    generalized    Flu Virus Vaccine 2013   Anaphylaxis    Olmesartan-Amlodipine-Hctz 2013       Irregular heart beat, irregular BP, blurred vision, vertigo (reaction= 3 weeks ago)      You were diagnosed with     Chronic diastolic congestive heart failure, NYHA class 2 (CMS-HCC)   [048794]       Essential hypertension   [5237167]       Hypertensive cardiomegaly with heart failure (CMS-HCC)   [1551109]       Morbid obesity due to excess calories (CMS-HCC)   [9842482]       Primary insomnia   [340384]       Chronic bilateral low back pain without sciatica   [6134256]         Vital Signs     Blood Pressure Pulse Temperature Respirations Height Weight    150/98 mmHg 113 36.6 °C (97.9 °F) 18 1.803 m (5' 11\") 139.254 kg (307 lb)    Body Mass Index Oxygen Saturation Smoking Status             42.84 kg/m2 97% Former Smoker         Basic Information     Date Of Birth Sex Race Ethnicity Preferred Language    1970 Male White Non- English      Your appointments     May 31, 2017  4:15 PM   Heart Failure New with Cole Teran M.D.   [facility] (--)    [address]  [place] [address] [phone]            Aug 21, 2017  2:40 PM   Established Patient with Aaron Chan M.D.   [facility]    [address]  [address]  [place] [address] [phone]           You will be receiving a confirmation call a few days before your appointment from our automated call confirmation system.              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.1   12/2/2016 - Present    BMI 40.0-44.9, adult (CMS-HCC) Z68.41   12/2/2016 - Present    Chronic diastolic congestive heart failure, NYHA class 2 (CMS-HCC) I50.32 High  4/26/2017 - Present    Chronic bilateral low back pain without sciatica M54.5, G89.29   4/26/2017 - Present    Status post right hip replacement Z96.641   4/26/2017 - Present    Chronic fatigue R53.82   4/26/2017 - Present    Low testosterone level in male E29.1   4/26/2017 - Present    Morbid obesity due to excess calories (CMS-HCC) E66.01 Medium  5/1/2017 - Present    Primary insomnia F51.01   5/18/2017 - Present      Health Maintenance        Date Due Completion Dates    IMM DTaP/Tdap/Td Vaccine (1 - Tdap) 2/21/1987 ---    IMM PNEUMOCOCCAL 19-64 (ADULT) MEDIUM RISK SERIES (1 of 1 - PPSV23) 2/21/1987 ---    COLONOSCOPY 11/17/2019 11/17/2014 (Declined), 5/10/2009 (Done)    Override on 11/17/2014: Patient Declined    Override on 5/10/2009: Done            Current Immunizations     No immunizations on file.      Below and/or attached are the medications your provider expects you to take. Review all of your home medications and newly ordered medications with your provider and/or pharmacist. Follow medication instructions as directed by your provider and/or pharmacist. Please keep your medication list with you and share with your provider. Update the information when medications are discontinued, doses are changed, or new medications (including over-the-counter products) are added; and carry medication information at all times in the event of emergency situations     Allergies:  NAPROXEN - Rash     FLU VIRUS VACCINE -  Anaphylaxis     OLMESARTAN-AMLODIPINE-HCTZ - (reactions not documented)               Medications  Valid as of: May 18, 2017 -  3:35 PM    Generic Name Brand Name Tablet Size Instructions for use    ALPRAZolam (Tab) XANAX 1 MG Take 1 Tab by mouth at bedtime as needed for Sleep.        Cetirizine HCl (Tab) ZYRTEC 10 MG Take 10 mg by mouth every day.        Doxazosin Mesylate (Tab) CARDURA 2 MG Take 1 Tab by mouth every bedtime.        Furosemide (Tab) LASIX 40 MG Take 1 Tab by mouth every day. And one-half tab in the afternoon.        HydrALAZINE HCl (Tab) APRESOLINE 50 MG Take 1 Tab by mouth 3 times a day.        Hydrocodone-Acetaminophen (Tab) NORCO  MG Take 1 Tab by mouth every four hours as needed for Moderate Pain or Severe Pain for up to 30 days.        Lisinopril (Tab) PRINIVIL, ZESTRIL 40 MG Take 1 Tab by mouth 2 times a day.        Magnesium Oxide (Tab) MAG- MG Take 500 mg by mouth every day.        Metoprolol Tartrate (Tab) LOPRESSOR 50 MG Take 1 Tab by mouth 2 times a day.        Nitroglycerin (SL Tab) NITROSTAT 0.3 MG Place 1 Tab under tongue See Admin Instructions. No more than 3 in 15 minutes.  If chest pain is still unresolved, call 911.        OxyCODONE HCl (Tab) OXY-IR 30 MG Take 1 Tab by mouth every four hours as needed for Moderate Pain.        Potassium Chloride (Pack) KLOR-CON 20 MEQ Take 2 Packets by mouth 2 times a day. Indications: lasix        .                 Medicines prescribed today were sent to:     [id number] - [place] - [address]    [address] [place] [address]    Phone: [phone] Fax: [phone]    Open 24 Hours?: No      Medication refill instructions:       If your prescription bottle indicates you have medication refills left, it is not necessary to call your provider’s office. Please contact your pharmacy and they will refill your medication.    If your prescription bottle indicates you do not have any refills left, you may  request refills at any time through one of the following ways: The online [employer] system (except Urgent Care), by calling your provider’s office, or by asking your pharmacy to contact your provider’s office with a refill request. Medication refills are processed only during regular business hours and may not be available until the next business day. Your provider may request additional information or to have a follow-up visit with you prior to refilling your medication.   *Please Note: Medication refills are assigned a new Rx number when refilled electronically. Your pharmacy may indicate that no refills were authorized even though a new prescription for the same medication is available at the pharmacy. Please request the medicine by name with the pharmacy before contacting your provider for a refill.        Your To Do List     Future Labs/Procedures Complete By Expires    BASIC METABOLIC PANEL  As directed 5/18/2018      Referral     A referral request has been sent to our patient care coordination department. Please allow 3-5 business days for us to process this request and contact you either by phone or mail. If you do not hear from us by the 5th business day, please call us at [phone].        Other Notes About Your Plan     SIGNED OA:   11/17/2014  LAST UDS: 11/17/2014             [employer] Access Code: Activation code not generated  Current [employer] Status: Active

## 2017-05-18 NOTE — ASSESSMENT & PLAN NOTE
"Patient acknowledges diagnosis of morbid obesity.  He states that he was a former , and therefore knows how to cook, but is frustrated with having to \"eat rabbit food.\"  However, he verbalizes understanding that he should make concerted effort to make healthy lifestyle changes in order to better control his current comorbidities (and hopefully be able to reduce medication dosages and frequency in the future) as well as to reduce risk of recurrence of cardiac conditions (morbidity & mortality) in the future.    Of note, patient and I have set target weight for this coming year to be 250lbs by next May 2018.    ROS is NEGATIVE for: cold or heat intolerance, anxiety/depression, chest pain/pressure, hair thickening/coarsening/falling out/thinning, skin changes, diarrhea/constipation.    ROS is NEGATIVE for blurred vision, polydipsia, polyuria, diaphoresis, palpitations, fatigue, irritability, flank pain, BLE paresthesias.  "

## 2017-05-18 NOTE — ASSESSMENT & PLAN NOTE
Patient was recently made for acute on chronic diastolic congestive heart failure, with adjustment of his medications to BE as follows: Hydralazine 50 mg by mouth 3 times a day, lisinopril 40 mg by mouth twice a day, and Lopressor 50 mg by mouth twice a day. Patient states that this, with addition of pain control, as long as blood pressures suggest to run in the 120s over 70s to 80s.    Patient states that blood pressures elevated today to 150/98 likely secondary to running out of his knuckles which are helping to control his pain. Patient has pain clinic follow-up with Dr. Zelaya within the following week, states that he has an appointment on Tuesday.    At present, patient is currently asymptomatic    Of note, recent echocardiogram reveals the patient did not appear to have interval myocardial infarction with LVEF being 75%, with Grade I diastolic dysfunction.  Patient has had a nuclear medicine stress test within the last year in December, and there was no myocardial infarct or inducible ischemia, but with moderate LEFT ventricular enlargement with reduced ejection fraction suggesting nonischemic cardiomyopathy.    Patient has an appointment with Dr. Teran, his cardiologist, on May 31.    ROS is NEGATIVE for dizziness, generalized weakness/fatigue, vision/hearing changes, jaw pain/paresthesias, BUE pain/paresthesias/numbness/weakness, chest pain/pressure, palpitations, dyspnea, RUQ abdominal pain, oliguria/anuria, BLE edema.

## 2017-05-19 NOTE — PROGRESS NOTES
Subjective:     Chief Complaint   Patient presents with   • Follow-Up     Hospitalized 2 weeks ago heart failure        History of Present Illness:  Giacomo Hunter is a 47 y.o. male established patient who presents today for post-hospitalization follow-up re: acute on chronic CHF exacerbation:    Chronic diastolic congestive heart failure, NYHA class 2 (CMS-[facility])  Patient was recently made for acute on chronic diastolic congestive heart failure, with adjustment of his medications to BE as follows: Hydralazine 50 mg by mouth 3 times a day, lisinopril 40 mg by mouth twice a day, and Lopressor 50 mg by mouth twice a day. Patient states that this, with addition of pain control, as long as blood pressures suggest to run in the 120s over 70s to 80s.    Patient states that blood pressures elevated today to 150/98 likely secondary to running out of his knuckles which are helping to control his pain. Patient has pain clinic follow-up with Dr. Zelaya within the following week, states that he has an appointment on Tuesday.    At present, patient is currently asymptomatic    Of note, recent echocardiogram reveals the patient did not appear to have interval myocardial infarction with LVEF being 75%, with Grade I diastolic dysfunction.  Patient has had a nuclear medicine stress test within the last year in December, and there was no myocardial infarct or inducible ischemia, but with moderate LEFT ventricular enlargement with reduced ejection fraction suggesting nonischemic cardiomyopathy.    Patient has an appointment with Dr. Teran, his cardiologist, on May 31.    ROS is NEGATIVE for dizziness, generalized weakness/fatigue, vision/hearing changes, jaw pain/paresthesias, BUE pain/paresthesias/numbness/weakness, chest pain/pressure, palpitations, dyspnea, RUQ abdominal pain, oliguria/anuria, BLE edema.    HTN (hypertension)  Please see notes from same date of service 05/18/17 regarding Chronic diastolic CHF    Hypertensive cardiomegaly  "with heart failure (CMS-HCC)  Please see notes from same date of service 05/18/17 regarding Chronic diastolic CHF    Morbid obesity due to excess calories (CMS-HCC)  Patient acknowledges diagnosis of morbid obesity.  He states that he was a former , and therefore knows how to cook, but is frustrated with having to \"eat rabbit food.\"  However, he verbalizes understanding that he should make concerted effort to make healthy lifestyle changes in order to better control his current comorbidities (and hopefully be able to reduce medication dosages and frequency in the future) as well as to reduce risk of recurrence of cardiac conditions (morbidity & mortality) in the future.    Of note, patient and I have set target weight for this coming year to be 250lbs by next May 2018.    ROS is NEGATIVE for: cold or heat intolerance, anxiety/depression, chest pain/pressure, hair thickening/coarsening/falling out/thinning, skin changes, diarrhea/constipation.    ROS is NEGATIVE for blurred vision, polydipsia, polyuria, diaphoresis, palpitations, fatigue, irritability, flank pain, BLE paresthesias.    Primary insomnia  Patient states that he has been unable to sleep, even when taking Valium.  Patient states that he was prescribed Valium instead of Xanax for sleep--perhaps to decrease addiction potential.  However, he was formerly able to go to sleep well on Xanax and stay asleep, usually on a dose of 1mg PO QHS PRN.    Patient denies varying the amount of caffeine consumed, is not drinking alcohol, isn't using any illicit drugs.    Chronic bilateral low back pain without sciatica  Patient is seeing Dr. Zelaya for pain control, states that he has run out of his Norco due to being switched from Q6hr dosing to Q4H dosing.      Patient states that he has an appointment with Dr. Zelaya within the next 2 weeks.  Patient is requesting an early fill of the medication.  Patient states that he avoids taking Oxycodone on a regular basis due " to the sedating effects it has on him.    Lastly, review of [facility] reveals that patient's last fill of Norco was on 04/24/17 for 120 tablets and his last fill of Oxycodone was on 04/24/17 for 120 tablets.  This indicates that the patient should have run out of his Norco somewhere between 3-4weeks if he was taking it every 4 hours while awake, which is consistent with patient's report as above.      ROS is NEGATIVE for respiratory depression, cognitive slowing, constipation, cyanotic skin changes.        Patient Active Problem List    Diagnosis Date Noted   • Chronic diastolic congestive heart failure, NYHA class 2 (CMS-HCC) 04/26/2017     Priority: High   • Morbid obesity due to excess calories (CMS-HCC) 05/01/2017     Priority: Medium   • Primary insomnia 05/18/2017   • Chronic bilateral low back pain without sciatica 04/26/2017   • Status post right hip replacement 04/26/2017   • Chronic fatigue 04/26/2017   • Low testosterone level in male 04/26/2017   • Hypertensive cardiomegaly with heart failure (CMS-HCC) 12/02/2016   • Tobacco use disorder 12/02/2016   • Polycythemia secondary to smoking 12/02/2016   • BMI 40.0-44.9, adult (CMS-HCC) 12/02/2016   • ADRIANNA (obstructive sleep apnea) 07/04/2013   • HTN (hypertension) 07/04/2013       Additional History:   Allergies:    Naproxen; Flu virus vaccine; and Olmesartan-amlodipine-hctz     Current Medications:     Current Outpatient Prescriptions   Medication Sig Dispense Refill   • nitroGLYCERIN (NITROSTAT) 0.3 MG SL tablet Place 1 Tab under tongue See Admin Instructions. No more than 3 in 15 minutes.  If chest pain is still unresolved, call 911. 100 Tab 0   • doxazosin (CARDURA) 2 MG Tab Take 1 Tab by mouth every bedtime. 30 Tab 0   • hydrALAZINE (APRESOLINE) 50 MG Tab Take 1 Tab by mouth 3 times a day. 270 Tab 1   • lisinopril (PRINIVIL, ZESTRIL) 40 MG tablet Take 1 Tab by mouth 2 times a day. 180 Tab 1   • metoprolol (LOPRESSOR) 50 MG Tab Take 1 Tab by mouth 2 times  "a day. 90 Tab 0   • hydrocodone/acetaminophen (NORCO)  MG Tab Take 1 Tab by mouth every four hours as needed for Moderate Pain or Severe Pain for up to 30 days. 180 Tab 0   • alprazolam (XANAX) 1 MG Tab Take 1 Tab by mouth at bedtime as needed for Sleep. 90 Tab 0   • furosemide (LASIX) 40 MG Tab Take 1 Tab by mouth every day. And one-half tab in the afternoon. 45 Tab 02   • potassium chloride (KLOR-CON) 20 MEQ Pack Take 2 Packets by mouth 2 times a day. Indications: lasix 60 Packet 2   • magnesium oxide (MAG-OX) 400 MG Tab Take 500 mg by mouth every day.     • oxycodone (OXY-IR) 30 MG immediate release tablet Take 1 Tab by mouth every four hours as needed for Moderate Pain. 120 Tab 0   • cetirizine (ZYRTEC) 10 MG TABS Take 10 mg by mouth every day.       No current facility-administered medications for this visit.        Social History:     Social History   Substance Use Topics   • Smoking status: Former Smoker -- 2.50 packs/day for 25 years     Types: Cigarettes     Start date: 01/01/1990     Quit date: 12/08/2016   • Smokeless tobacco: Never Used      Comment: 2-3ppd [id number]; Currently Vaping   • Alcohol Use: 6.0 oz/week     7 Glasses of wine, 5 Cans of beer per week      Comment: 2 x per week       ROS:     - ROS is NEGATIVE for dizziness, generalized weakness/fatigue, vision/hearing changes, jaw pain/paresthesias, BUE pain/paresthesias/numbness/weakness, chest pain/pressure, palpitations, dyspnea, RUQ abdominal pain, oliguria/anuria, BLE edema.    - ROS is NEGATIVE for: cold or heat intolerance, anxiety/depression, chest pain/pressure, hair thickening/coarsening/falling out/thinning, skin changes, diarrhea/constipation.    - NOTE: All other systems reviewed and are negative, except as in HPI.     Objective:   Physical Exam:    Vitals: Blood pressure 150/98, pulse 113, temperature 36.6 °C (97.9 °F), resp. rate 18, height 1.803 m (5' 11\"), weight 139.254 kg (307 lb), SpO2 97 %.   BMI: Body mass index is " 42.84 kg/(m^2).   General/Constitutional: Vitals as above, Well nourished, well developed male in no acute distress   Head/Eyes: Head is grossly normal & atraumatic, bilateral conjunctivae clear and not injected, bilateral EOMI, bilateral PERRL   ENT: Bilateral external ears grossly normal in appearance, Hearing grossly intact, External nares normal in appearance and without discharge/bleeding   Respiratory: No respiratory distress, bilateral lungs are clear to ausculation in all lung fields (anterior/lateral/posterior), no wheezing/rhonchi/rales   Cardiovascular: Regular rate and rhythm without murmur/gallops/rubs, distal pulses are intact and equal bilaterally (radial, posterior tibial), no bilateral lower extremity edema   MSK: Gait grossly normal & not antalgic   Integumentary: No apparent rashes   Psych: Judgment grossly appropriate, no apparent depression/anxiety    Imaging/Labs:      - Echo reviewed as in HPI above      - CXR and CTA (both on 05/01/2017) reviewed and interpreted to be negative     Assessment and Plan:   1. Chronic diastolic congestive heart failure, NYHA class 2 (CMS-HCC)  Uncontrolled, but improving.  May be contributory to patient's mildly uncontrolled HTN.     A) Labs: Will obtain BMP to evaluate whether increased diuretics' dosing is not adversely affecting kidneys.    - BASIC METABOLIC PANEL; Future   B) Management: Continue care with Cardiologist (Dr. Teran) on 05/31.  Referral to [facility] to help supervise recovery and then improve post-cardiac event health afterwards.    - REFERRAL TO INTENSIVE CARDIAC REHAB/CARDIAC REHAB     2. Essential hypertension  3. Hypertensive cardiomegaly with heart failure (CMS-HCC)  Uncontrolled, but improving.  Interval change of LVEF from last year to this year.  May need further echocardiogram after sufficient time has elapsed to allow for cardiac recovery.   A) Management: Referral to [facility] to help supervise recovery and then improve post-cardiac event health  afterwards.    - REFERRAL TO INTENSIVE CARDIAC REHAB/CARDIAC REHAB   B) Medication: Multiple medications for blood pressure control.  Nitro in case he has ischemic cardiomyopathy (angina vs. MI)    - nitroGLYCERIN (NITROSTAT) 0.3 MG SL tablet; Place 1 Tab under tongue See Admin Instructions. No more than 3 in 15 minutes.  If chest pain is still unresolved, call 911.  Dispense: 100 Tab; Refill: 0    - doxazosin (CARDURA) 2 MG Tab; Take 1 Tab by mouth every bedtime.  Dispense: 30 Tab; Refill: 0    - hydrALAZINE (APRESOLINE) 50 MG Tab; Take 1 Tab by mouth 3 times a day.  Dispense: 270 Tab; Refill: 1    - lisinopril (PRINIVIL, ZESTRIL) 40 MG tablet; Take 1 Tab by mouth 2 times a day.  Dispense: 180 Tab; Refill: 1    - metoprolol (LOPRESSOR) 50 MG Tab; Take 1 Tab by mouth 2 times a day.  Dispense: 90 Tab; Refill: 0    4. Morbid obesity due to excess calories (CMS-HCC)  Uncontrolled.  Patient and I discussed the importance of lifestyle changes, with particular emphasis on plant-based nutrition (for the purposes of weight loss, general health, HTN, LBP), as well as cardiovascular exercise, proper sleep, and stress management.  Referral to [facility] as in #1-3 as above.  Pt verbalized understanding.    5. Primary insomnia  Uncontrolled.  Patient instructed to d/c Valium, dispose of this safely at the pharmacy.  Rx for Xanax, instead.   - alprazolam (XANAX) 1 MG Tab; Take 1 Tab by mouth at bedtime as needed for Sleep.  Dispense: 90 Tab; Refill: 0    6. Chronic bilateral low back pain without sciatica  Uncontrolled.  Patient rx'd Norco as below due to running out of medication (see HPI as above), informed this is a one-time fill.  Patient then instructed to follow-up with Dr. Zelaya.   - hydrocodone/acetaminophen (NORCO)  MG Tab; Take 1 Tab by mouth every four hours as needed for Moderate Pain or Severe Pain for up to 30 days.  Dispense: 180 Tab; Refill: 0    NOTE: A total of 40minutes was spent in direct face-to-face time  with the patient, of which over 50% of the time was spent in counseling and/or coordination of care, the contents of which are described in this note.    RTC: 3months for general checkup, Lifestyle changes follow-up.    PLEASE NOTE: This dictation was created using voice recognition software. I have made every reasonable attempt to correct obvious errors, but I expect that there are errors of grammar and possibly content that I did not discover before finalizing the note.

## 2017-05-19 NOTE — ASSESSMENT & PLAN NOTE
Patient is seeing Dr. Zelaya for pain control, states that he has run out of his Norco due to being switched from Q6hr dosing to Q4H dosing.      Patient states that he has an appointment with Dr. Zelaya within the next 2 weeks.  Patient is requesting an early fill of the medication.  Patient states that he avoids taking Oxycodone on a regular basis due to the sedating effects it has on him.    Lastly, [facility] reveals that patient's last fill of Norco was on 04/24/17 for 120 tablets and his last fill of Oxycodone was on 04/24/17 for 120 tablets.  This indicates that the patient should have run out of his Norco somewhere between 3-4weeks if he was taking it every 4 hours while awake, which is consistent with patient's report as above.      ROS is NEGATIVE for respiratory depression, cognitive slowing, constipation, cyanotic skin changes.

## 2017-05-19 NOTE — ASSESSMENT & PLAN NOTE
Patient states that he has been unable to sleep, even when taking Valium.  Patient states that he was prescribed Valium instead of Xanax for sleep--perhaps to decrease addiction potential.  However, he was formerly able to go to sleep well on Xanax and stay asleep, usually on a dose of 1mg PO QHS PRN.    Patient denies varying the amount of caffeine consumed, is not drinking alcohol, isn't using any illicit drugs.

## 2017-06-14 DIAGNOSIS — I50.32 CHRONIC DIASTOLIC CONGESTIVE HEART FAILURE (HCC): ICD-10-CM

## 2017-06-14 RX ORDER — FUROSEMIDE 40 MG/1
40 TABLET ORAL DAILY
Qty: 45 TAB | Refills: 2 | Status: SHIPPED | OUTPATIENT
Start: 2017-06-14 | End: 2017-07-05

## 2017-06-14 NOTE — TELEPHONE ENCOUNTER
Was the patient seen in the last year in this department? Yes     Does patient have an active prescription for medications requested? Yes     Received Request Via: Pharmacy         Last visit: 05/18/17  Last labs:05/09/17

## 2017-07-05 ENCOUNTER — OFFICE VISIT (OUTPATIENT)
Dept: MEDICAL GROUP | Facility: PHYSICIAN GROUP | Age: 47
End: 2017-07-05
Payer: COMMERCIAL

## 2017-07-05 ENCOUNTER — TELEPHONE (OUTPATIENT)
Dept: MEDICAL GROUP | Facility: MEDICAL CENTER | Age: 47
End: 2017-07-05

## 2017-07-05 VITALS
BODY MASS INDEX: 42.98 KG/M2 | WEIGHT: 307 LBS | RESPIRATION RATE: 26 BRPM | TEMPERATURE: 98.4 F | HEART RATE: 86 BPM | SYSTOLIC BLOOD PRESSURE: 164 MMHG | DIASTOLIC BLOOD PRESSURE: 120 MMHG | OXYGEN SATURATION: 95 % | HEIGHT: 71 IN

## 2017-07-05 DIAGNOSIS — I50.32 CHRONIC DIASTOLIC CONGESTIVE HEART FAILURE, NYHA CLASS 2 (HCC): ICD-10-CM

## 2017-07-05 DIAGNOSIS — I11.0 HYPERTENSIVE CARDIOMEGALY WITH HEART FAILURE (HCC): ICD-10-CM

## 2017-07-05 DIAGNOSIS — I50.33 ACUTE ON CHRONIC DIASTOLIC CONGESTIVE HEART FAILURE (HCC): Primary | ICD-10-CM

## 2017-07-05 DIAGNOSIS — G47.33 OSA (OBSTRUCTIVE SLEEP APNEA): ICD-10-CM

## 2017-07-05 PROCEDURE — 99214 OFFICE O/P EST MOD 30 MIN: CPT | Performed by: NURSE PRACTITIONER

## 2017-07-05 RX ORDER — METOLAZONE 5 MG/1
5 TABLET ORAL DAILY
Qty: 30 TAB | Refills: 1 | Status: SHIPPED | OUTPATIENT
Start: 2017-07-05 | End: 2017-09-16 | Stop reason: SDUPTHER
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
## 2017-07-05 NOTE — TELEPHONE ENCOUNTER
Pt called stating that his medication (lasix) is not helping with the swelling. He stated that his fingers seems to get bigger and he's been gaining a lot of water weight. He also stated that the medication has caused him to have difficulty breathing. Pt is requesting to get a new prescription. Pt was offered to make an appointment to see Dr. Chan on (Tuesday 07/11/17) or earlier appointments with other providers on site but he stated that he can't wait any longer and would rather go to .

## 2017-07-05 NOTE — MR AVS SNAPSHOT
"Giacomo Hunter   2017 4:00 PM   Office Visit   MRN: [id number]    Department:  [facility]   Dept Phone:  [phone]    Description:  Male : 1970   Provider:  JENNIFER Stafford           Reason for Visit     Shortness of Breath     Edema all over      Allergies as of 2017     Allergen Noted Reactions    Naproxen 2016   Rash    generalized    Flu Virus Vaccine 2013   Anaphylaxis    Olmesartan-Amlodipine-Hctz 2013       Irregular heart beat, irregular BP, blurred vision, vertigo (reaction= 3 weeks ago)      You were diagnosed with     Acute on chronic diastolic congestive heart failure (CMS-[facility])   [863633]  -  Primary     Hypertensive cardiomegaly with heart failure (CMS-HCC)   [4731814]       ADRIANNA (obstructive sleep apnea)   [275715]       Chronic diastolic congestive heart failure, NYHA class 2 (CMS-[facility])   [681525]       BMI 40.0-44.9, adult (CMS-[facility])   [599194]         Vital Signs     Blood Pressure Pulse Temperature Respirations Height Weight    164/120 mmHg 86 36.9 °C (98.4 °F) 26 1.803 m (5' 11\") 139.254 kg (307 lb)    Body Mass Index Oxygen Saturation Smoking Status             42.84 kg/m2 95% Former Smoker         Basic Information     Date Of Birth Sex Race Ethnicity Preferred Language    1970 Male White Non- English      Your appointments     Aug 21, 2017  2:40 PM   Established Patient with Aaron Chan M.D.   [facility])    [address]  [address]  [facility] [id number] [phone]           You will be receiving a confirmation call a few days before your appointment from our automated call confirmation system.              Problem List              ICD-10-CM Priority Class Noted - Resolved    ADRIANNA (obstructive sleep apnea) G47.33   2013 - Present    HTN (hypertension) I10   2013 - Present    Hypertensive cardiomegaly with heart failure (CMS-HCC) I11.0   2016 - Present   "    Tobacco use disorder F17.200   12/2/2016 - Present    Polycythemia secondary to smoking D75.1   12/2/2016 - Present    BMI 40.0-44.9, adult (CMS-HCC) Z68.41   12/2/2016 - Present    Chronic diastolic congestive heart failure, NYHA class 2 (CMS-HCC) I50.32 High  4/26/2017 - Present    Chronic bilateral low back pain without sciatica M54.5, G89.29   4/26/2017 - Present    Status post right hip replacement Z96.641   4/26/2017 - Present    Chronic fatigue R53.82   4/26/2017 - Present    Low testosterone level in male E29.1   4/26/2017 - Present    Morbid obesity due to excess calories (CMS-HCC) E66.01 Medium  5/1/2017 - Present    Primary insomnia F51.01   5/18/2017 - Present      Health Maintenance        Date Due Completion Dates    IMM DTaP/Tdap/Td Vaccine (1 - Tdap) 2/21/1987 ---    IMM PNEUMOCOCCAL 19-64 (ADULT) MEDIUM RISK SERIES (1 of 1 - PPSV23) 2/21/1987 ---    IMM INFLUENZA (1) 9/1/2017 ---    COLONOSCOPY 11/17/2019 11/17/2014 (Declined), 5/10/2009 (Done)    Override on 11/17/2014: Patient Declined    Override on 5/10/2009: Done            Current Immunizations     No immunizations on file.      Below and/or attached are the medications your provider expects you to take. Review all of your home medications and newly ordered medications with your provider and/or pharmacist. Follow medication instructions as directed by your provider and/or pharmacist. Please keep your medication list with you and share with your provider. Update the information when medications are discontinued, doses are changed, or new medications (including over-the-counter products) are added; and carry medication information at all times in the event of emergency situations     Allergies:  NAPROXEN - Rash     FLU VIRUS VACCINE - Anaphylaxis     OLMESARTAN-AMLODIPINE-HCTZ - (reactions not documented)               Medications  Valid as of: July 05, 2017 -  7:06 PM    Generic Name Brand Name Tablet Size Instructions for use    ALPRAZolam  (Tab) XANAX 1 MG Take 1 Tab by mouth at bedtime as needed for Sleep.        Cetirizine HCl (Tab) ZYRTEC 10 MG Take 10 mg by mouth every day.        Doxazosin Mesylate (Tab) CARDURA 2 MG Take 1 Tab by mouth every bedtime.        HydrALAZINE HCl (Tab) APRESOLINE 50 MG Take 1 Tab by mouth 3 times a day.        Lisinopril (Tab) PRINIVIL, ZESTRIL 40 MG Take 1 Tab by mouth 2 times a day.        Magnesium Oxide (Tab) MAG- MG Take 500 mg by mouth every day.        MetOLazone (Tab) ZAROXOLYN 5 MG Take 1 Tab by mouth every day.        Metoprolol Tartrate (Tab) LOPRESSOR 50 MG Take 1 Tab by mouth 2 times a day.        Nitroglycerin (SL Tab) NITROSTAT 0.3 MG Place 1 Tab under tongue See Admin Instructions. No more than 3 in 15 minutes.  If chest pain is still unresolved, call 911.        OxyCODONE HCl (Tab) OXY-IR 30 MG Take 1 Tab by mouth every four hours as needed for Moderate Pain.        Potassium Chloride (Pack) KLOR-CON 20 MEQ Take 2 Packets by mouth 2 times a day. Indications: lasix        .                 Medicines prescribed today were sent to:     [id number] - [place], [place] - [address]    [address] [place] [address]    Phone: [phone] Fax: [phone]    Open 24 Hours?: No      Medication refill instructions:       If your prescription bottle indicates you have medication refills left, it is not necessary to call your provider’s office. Please contact your pharmacy and they will refill your medication.    If your prescription bottle indicates you do not have any refills left, you may request refills at any time through one of the following ways: The online [employer] system (except Urgent Care), by calling your provider’s office, or by asking your pharmacy to contact your provider’s office with a refill request. Medication refills are processed only during regular business hours and may not be available until the next business day. Your provider may request additional information  or to have a follow-up visit with you prior to refilling your medication.   *Please Note: Medication refills are assigned a new Rx number when refilled electronically. Your pharmacy may indicate that no refills were authorized even though a new prescription for the same medication is available at the pharmacy. Please request the medicine by name with the pharmacy before contacting your provider for a refill.        Your To Do List     Future Labs/Procedures Complete By Expires    BTYPE NATRIURETIC PEPTIDE  As directed 7/5/2018    COMP METABOLIC PANEL  As directed 7/5/2018      Other Notes About Your Plan     SIGNED OA:   11/17/2014  LAST UDS: 11/17/2014             [employer] Access Code: Activation code not generated  Current [employer] Status: Active

## 2017-07-06 ENCOUNTER — TELEPHONE (OUTPATIENT)
Dept: MEDICAL GROUP | Facility: MEDICAL CENTER | Age: 47
End: 2017-07-06

## 2017-07-09 ENCOUNTER — TELEPHONE (OUTPATIENT)
Dept: MEDICAL GROUP | Facility: MEDICAL CENTER | Age: 47
End: 2017-07-09

## 2017-07-10 NOTE — TELEPHONE ENCOUNTER
Spoke with patient and let them know Aaron Chan M.D.'s message. Pt is scheduled to see Dr. Chan on 07/19/17 at 8:20. Pt stated that the new medication metolazone is working fine. He also mentioned that his BP had normalized. The last time he took his BP was this morning (07/10/11) and it was around 120/80. He said that he sometimes skip his BP medication when he knows that it is around the normal range, because sometimes his BP gets too low and he gets dizzy.

## 2017-07-10 NOTE — TELEPHONE ENCOUNTER
Patient needs to see me within the next 1-2weeks to discuss his shortness of breath and increased swelling.  Patient needs to have labs done ASAP (B-type natriuretic peptide, Comprehensive Metabolic Panel).

## 2017-07-11 DIAGNOSIS — I10 ESSENTIAL HYPERTENSION: ICD-10-CM

## 2017-07-11 NOTE — TELEPHONE ENCOUNTER
Was the patient seen in the last year in this department? Yes     Does patient have an active prescription for medications requested? Yes     Received Request Via: Pharmacy      Last visit: 05/18/17  Last labs:05/08/17      Pt is already scheduled for f/v with Dr. Chan on 07/19/17 @ 8:20am.

## 2017-07-12 RX ORDER — DOXAZOSIN 2 MG/1
2 TABLET ORAL
Qty: 30 TAB | Refills: 0 | Status: SHIPPED | OUTPATIENT
Start: 2017-07-12 | End: 2017-08-15 | Stop reason: SDUPTHER

## 2017-07-19 ENCOUNTER — TELEPHONE (OUTPATIENT)
Dept: MEDICAL GROUP | Facility: MEDICAL CENTER | Age: 47
End: 2017-07-19

## 2017-07-19 ENCOUNTER — OFFICE VISIT (OUTPATIENT)
Dept: MEDICAL GROUP | Facility: MEDICAL CENTER | Age: 47
End: 2017-07-19
Payer: COMMERCIAL

## 2017-07-19 VITALS
HEART RATE: 106 BPM | WEIGHT: 304 LBS | OXYGEN SATURATION: 95 % | SYSTOLIC BLOOD PRESSURE: 140 MMHG | BODY MASS INDEX: 42.56 KG/M2 | HEIGHT: 71 IN | DIASTOLIC BLOOD PRESSURE: 88 MMHG | TEMPERATURE: 98.4 F

## 2017-07-19 DIAGNOSIS — J44.1 CHRONIC OBSTRUCTIVE PULMONARY DISEASE WITH ACUTE EXACERBATION (HCC): ICD-10-CM

## 2017-07-19 DIAGNOSIS — I50.32 CHRONIC DIASTOLIC CONGESTIVE HEART FAILURE, NYHA CLASS 3 (HCC): ICD-10-CM

## 2017-07-19 DIAGNOSIS — I10 ESSENTIAL HYPERTENSION: ICD-10-CM

## 2017-07-19 PROBLEM — J44.9 COPD (CHRONIC OBSTRUCTIVE PULMONARY DISEASE) (HCC): Status: ACTIVE | Noted: 2017-07-19

## 2017-07-19 PROCEDURE — 99214 OFFICE O/P EST MOD 30 MIN: CPT | Performed by: FAMILY MEDICINE

## 2017-07-19 RX ORDER — ALBUTEROL SULFATE 90 UG/1
2 AEROSOL, METERED RESPIRATORY (INHALATION) EVERY 6 HOURS PRN
Qty: 8.5 G | Refills: 1 | Status: SHIPPED | OUTPATIENT
Start: 2017-07-19

## 2017-07-19 NOTE — TELEPHONE ENCOUNTER
Pt's DMV disability is ready to be picked up. I already called the pt to inform him that his form will be at the .

## 2017-07-19 NOTE — MR AVS SNAPSHOT
"        Giacomo Grandazeynep   2017 8:20 AM   Office Visit   MRN: [id number]    Department:  [place] [address]   Dept Phone:  [phone]    Description:  Male : 1970   Provider:  Aaron Chan M.D.           Reason for Visit     Results labs >>      Allergies as of 2017     Allergen Noted Reactions    Naproxen 2016   Rash    generalized    Flu Virus Vaccine 2013   Anaphylaxis    Olmesartan-Amlodipine-Hctz 2013       Irregular heart beat, irregular BP, blurred vision, vertigo (reaction= 3 weeks ago)      You were diagnosed with     Essential hypertension   [7143841]       Chronic diastolic congestive heart failure, NYHA class 2 (CMS-HCC)   [476482]       Chronic obstructive pulmonary disease with acute exacerbation (CMS-HCC)   [552529]         Vital Signs     Blood Pressure Pulse Temperature Height Weight Body Mass Index    140/88 mmHg 106 36.9 °C (98.4 °F) 1.803 m (5' 11\") 137.893 kg (304 lb) 42.42 kg/m2    Oxygen Saturation Smoking Status                95% Former Smoker          Basic Information     Date Of Birth Sex Race Ethnicity Preferred Language    1970 Male White Non- English      Your appointments     Oct 19, 2017  7:00 AM   Established Patient with Aaron Chan M.D.   [facility])    [address]  [address]  [facility] [id number] [phone]           You will be receiving a confirmation call a few days before your appointment from our automated call confirmation system.              Problem List              ICD-10-CM Priority Class Noted - Resolved    ADRIANNA (obstructive sleep apnea) G47.33   2013 - Present    HTN (hypertension) I10   2013 - Present    Tobacco use disorder F17.200   2016 - Present    Polycythemia secondary to smoking D75.1   2016 - Present    BMI 40.0-44.9, adult (CMS-HCC) Z68.41   2016 - Present    Chronic diastolic congestive heart failure, NYHA class 2 " (CMS-HCC) I50.32 High  4/26/2017 - Present    Chronic bilateral low back pain without sciatica M54.5, G89.29   4/26/2017 - Present    Status post right hip replacement Z96.641   4/26/2017 - Present    Chronic fatigue R53.82   4/26/2017 - Present    Low testosterone level in male E29.1   4/26/2017 - Present    Morbid obesity due to excess calories (CMS-HCC) E66.01 Medium  5/1/2017 - Present    Primary insomnia F51.01   5/18/2017 - Present    COPD (chronic obstructive pulmonary disease) (CMS-HCC) J44.9   7/19/2017 - Present      Health Maintenance        Date Due Completion Dates    IMM DTaP/Tdap/Td Vaccine (1 - Tdap) 2/21/1987 ---    IMM PNEUMOCOCCAL 19-64 (ADULT) MEDIUM RISK SERIES (1 of 1 - PPSV23) 2/21/1987 ---    IMM INFLUENZA (1) 9/1/2017 ---    COLONOSCOPY 11/17/2019 11/17/2014 (Declined), 5/10/2009 (Done)    Override on 11/17/2014: Patient Declined    Override on 5/10/2009: Done            Current Immunizations     No immunizations on file.      Below and/or attached are the medications your provider expects you to take. Review all of your home medications and newly ordered medications with your provider and/or pharmacist. Follow medication instructions as directed by your provider and/or pharmacist. Please keep your medication list with you and share with your provider. Update the information when medications are discontinued, doses are changed, or new medications (including over-the-counter products) are added; and carry medication information at all times in the event of emergency situations     Allergies:  NAPROXEN - Rash     FLU VIRUS VACCINE - Anaphylaxis     OLMESARTAN-AMLODIPINE-HCTZ - (reactions not documented)               Medications  Valid as of: July 19, 2017 -  9:03 AM    Generic Name Brand Name Tablet Size Instructions for use    Albuterol Sulfate (Aero Soln) albuterol 108 (90 BASE) MCG/ACT Inhale 2 Puffs by mouth every 6 hours as needed for Shortness of Breath.        ALPRAZolam (Tab) XANAX 1  MG Take 1 Tab by mouth at bedtime as needed for Sleep.        Cetirizine HCl (Tab) ZYRTEC 10 MG Take 10 mg by mouth every day.        Doxazosin Mesylate (Tab) CARDURA 2 MG Take 1 Tab by mouth every bedtime.        Fluticasone-Salmeterol (AEROSOL POWDER, BREATH ACTIVATED) ADVAIR 250-50 MCG/DOSE Inhale 1 Puff by mouth every 12 hours.        HydrALAZINE HCl (Tab) APRESOLINE 50 MG Take 1 Tab by mouth 3 times a day.        Lisinopril (Tab) PRINIVIL, ZESTRIL 40 MG Take 1 Tab by mouth 2 times a day.        Magnesium Oxide (Tab) MAG- MG Take 500 mg by mouth every day.        MetOLazone (Tab) ZAROXOLYN 5 MG Take 1 Tab by mouth every day.        Metoprolol Tartrate (Tab) LOPRESSOR 50 MG Take 1 Tab by mouth 2 times a day.        Nitroglycerin (SL Tab) NITROSTAT 0.3 MG Place 1 Tab under tongue See Admin Instructions. No more than 3 in 15 minutes.  If chest pain is still unresolved, call 911.        OxyCODONE HCl (Tab) OXY-IR 30 MG Take 1 Tab by mouth every four hours as needed for Moderate Pain.        Potassium Chloride (Pack) KLOR-CON 20 MEQ Take 2 Packets by mouth 2 times a day. Indications: lasix        .                 Medicines prescribed today were sent to:     [id number] - [place] - [address]    [address] [facility] [address]    Phone: [phone] Fax: [phone]    Open 24 Hours?: No      Medication refill instructions:       If your prescription bottle indicates you have medication refills left, it is not necessary to call your provider’s office. Please contact your pharmacy and they will refill your medication.    If your prescription bottle indicates you do not have any refills left, you may request refills at any time through one of the following ways: The online [employer] system (except Urgent Care), by calling your provider’s office, or by asking your pharmacy to contact your provider’s office with a refill request. Medication refills are processed only during regular  business hours and may not be available until the next business day. Your provider may request additional information or to have a follow-up visit with you prior to refilling your medication.   *Please Note: Medication refills are assigned a new Rx number when refilled electronically. Your pharmacy may indicate that no refills were authorized even though a new prescription for the same medication is available at the pharmacy. Please request the medicine by name with the pharmacy before contacting your provider for a refill.        Other Notes About Your Plan     SIGNED OA:   11/17/2014  LAST UDS: 11/17/2014             [employer] Access Code: Activation code not generated  Current [employer] Status: Active
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
## 2017-08-07 RX ORDER — METOPROLOL TARTRATE 50 MG/1
TABLET, FILM COATED ORAL
Qty: 180 TAB | Refills: 0 | Status: SHIPPED | OUTPATIENT
Start: 2017-08-07

## 2017-08-07 NOTE — TELEPHONE ENCOUNTER
Was the patient seen in the last year in this department? Yes     Does patient have an active prescription for medications requested? Yes     Received Request Via: Pharmacy     Last office visit: 07/19/2017  Last labs: 05/10/2017

## 2017-08-10 DIAGNOSIS — F51.01 PRIMARY INSOMNIA: ICD-10-CM

## 2017-08-10 RX ORDER — ALPRAZOLAM 1 MG/1
1 TABLET ORAL NIGHTLY PRN
Qty: 90 TAB | Refills: 0 | OUTPATIENT
Start: 2017-08-10

## 2017-08-11 NOTE — TELEPHONE ENCOUNTER
Was the patient seen in the last year in this department? Yes     Does patient have an active prescription for medications requested? Yes     Received Request Via: Patient    Last visit: 07/19/17  Last labs:05/09/17

## 2017-08-11 NOTE — TELEPHONE ENCOUNTER
Patient is already taking Valium, which is medication the same class as Xanax. Therefore, he should continue with the Valium for both muscle relaxation as well as sleep.

## 2017-08-15 RX ORDER — DOXAZOSIN 2 MG/1
TABLET ORAL
Qty: 90 TAB | Refills: 1 | Status: SHIPPED | OUTPATIENT
Start: 2017-08-15

## 2017-08-15 NOTE — TELEPHONE ENCOUNTER
Was the patient seen in the last year in this department? Yes     Does patient have an active prescription for medications requested? Yes     Received Request Via: Pharmacy       Last visit: 07/19/17  Last labs:05/10/17

## 2017-08-16 DIAGNOSIS — F51.01 PRIMARY INSOMNIA: ICD-10-CM

## 2017-08-16 NOTE — TELEPHONE ENCOUNTER
Was the patient seen in the last year in this department? Yes     Does patient have an active prescription for medications requested? Yes     Received Request Via: Patient       Last visit: 07/19/17  Last labs:05/09/17      PS: pt stated that he is not taking Valium anymore. He said that he does not like the feeling the effects of Valium on him.

## 2017-08-17 RX ORDER — ALPRAZOLAM 1 MG/1
1 TABLET ORAL NIGHTLY PRN
Qty: 90 TAB | Refills: 0 | Status: SHIPPED | OUTPATIENT
Start: 2017-08-17 | End: 2017-12-11 | Stop reason: SDUPTHER

## 2017-08-18 NOTE — TELEPHONE ENCOUNTER
Patient MUST bring in the Valium prescription to the pharmacy prior to him getting the new Xanax prescription.

## 2017-08-24 LAB — EKG IMPRESSION: NORMAL

## 2017-08-29 ENCOUNTER — TELEPHONE (OUTPATIENT)
Dept: PULMONOLOGY | Facility: HOSPICE | Age: 47
End: 2017-08-29

## 2017-08-29 DIAGNOSIS — R06.00 DYSPNEA, UNSPECIFIED TYPE: ICD-10-CM

## 2017-08-30 ENCOUNTER — NON-PROVIDER VISIT (OUTPATIENT)
Dept: PULMONOLOGY | Facility: HOSPICE | Age: 47
End: 2017-08-30
Attending: FAMILY MEDICINE
Payer: COMMERCIAL

## 2017-08-30 VITALS — WEIGHT: 300 LBS | BODY MASS INDEX: 42.95 KG/M2 | HEIGHT: 70 IN

## 2017-08-30 DIAGNOSIS — R06.00 DYSPNEA, UNSPECIFIED TYPE: ICD-10-CM

## 2017-08-30 PROCEDURE — 94729 DIFFUSING CAPACITY: CPT | Performed by: INTERNAL MEDICINE

## 2017-08-30 PROCEDURE — 94726 PLETHYSMOGRAPHY LUNG VOLUMES: CPT | Performed by: INTERNAL MEDICINE

## 2017-08-30 PROCEDURE — 94060 EVALUATION OF WHEEZING: CPT | Performed by: INTERNAL MEDICINE

## 2017-08-30 ASSESSMENT — PULMONARY FUNCTION TESTS
FEV1: 2.87
FEV1/FVC_PERCENT_CHANGE: 200
FEV1_PERCENT_PREDICTED: 72
FEV1_PERCENT_CHANGE: 2
FEV1: 2.95
FEV1/FVC_PERCENT_PREDICTED: 99
FEV1/FVC_PERCENT_PREDICTED: 78
FEV1/FVC: 78.04
FEV1/FVC_PERCENT_PREDICTED: 100
FVC_PERCENT_PREDICTED: 74
FVC: 3.73
FEV1/FVC: 77
FVC: 3.78
FEV1_PERCENT_PREDICTED: 74
FVC_PERCENT_PREDICTED: 73
FVC_PREDICTED: 5.07
FEV1_PERCENT_CHANGE: 1
FEV1_PREDICTED: 3.94

## 2017-08-30 NOTE — PROCEDURES
Technician: BRINDA Koo    Technician Comment:  Good patient effort & cooperation.  The results of this test meet the ATS/ERS standards for acceptability & reproducibility.  Test was performed on the [employer]. Body Plethysmograph-Elite DX system.  Predicted values were [facility]-3 for spirometry, [facility] for DLCO, ITS for Lung Volumes.  The DLCO was uncorrected for Hgb.  A bronchodilator of Xopenex HFA -2puffs via spacer administered.    Interpretation:  1. Baseline spirometry demonstrates a mild reduction in FEV1 and FVC. FEV1/FVC ratio is reduced.    2. After administration of an inhaled bronchodilator there is no improvement in FEV1.    3. Lung volumes demonstrate a normal total lung capacity.    4. Gas exchange as estimated by DLCO is normal.    5. Airway resistance is normal.    6. Flow volume loop is normal.      Impression:    This study demonstrates the presence of a mild restrictive process. This may be due to the patient's weight No reversibility is noted on the study.

## 2017-08-31 ENCOUNTER — OFFICE VISIT (OUTPATIENT)
Dept: PULMONOLOGY | Facility: HOSPICE | Age: 47
End: 2017-08-31
Payer: COMMERCIAL

## 2017-08-31 VITALS
DIASTOLIC BLOOD PRESSURE: 80 MMHG | WEIGHT: 307 LBS | BODY MASS INDEX: 43.95 KG/M2 | TEMPERATURE: 97.3 F | OXYGEN SATURATION: 97 % | RESPIRATION RATE: 16 BRPM | HEART RATE: 44 BPM | HEIGHT: 70 IN | SYSTOLIC BLOOD PRESSURE: 122 MMHG

## 2017-08-31 DIAGNOSIS — G47.33 OSA (OBSTRUCTIVE SLEEP APNEA): ICD-10-CM

## 2017-08-31 DIAGNOSIS — J44.1 CHRONIC OBSTRUCTIVE PULMONARY DISEASE WITH ACUTE EXACERBATION (HCC): ICD-10-CM

## 2017-08-31 PROCEDURE — 99213 OFFICE O/P EST LOW 20 MIN: CPT | Performed by: NURSE PRACTITIONER

## 2017-08-31 RX ORDER — ZOLPIDEM TARTRATE 5 MG/1
TABLET ORAL
Qty: 3 TAB | Refills: 0 | Status: SHIPPED | OUTPATIENT
Start: 2017-08-31 | End: 2017-12-11

## 2017-08-31 RX ORDER — LEVALBUTEROL TARTRATE 45 UG/1
2 AEROSOL, METERED ORAL EVERY 4 HOURS PRN
Qty: 1 INHALER | Refills: 5 | Status: SHIPPED | OUTPATIENT
Start: 2017-08-31
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
## 2017-08-31 NOTE — PATIENT INSTRUCTIONS
1. polysomnogram.  2. Zolpidem 5 mg #3.  3. Continue Advair 100/50 micrograms twice daily.  4. Prescription for Xopenex HFA. Patient has extremely large aneurysm and cannot tolerate palpitations or increase in blood pressure or heart rate which is associated with albuterol.

## 2017-09-13 ENCOUNTER — TELEPHONE (OUTPATIENT)
Dept: SLEEP MEDICINE | Facility: MEDICAL CENTER | Age: 47
End: 2017-09-13

## 2017-09-13 DIAGNOSIS — G47.33 OSA (OBSTRUCTIVE SLEEP APNEA): ICD-10-CM

## 2017-09-13 NOTE — TELEPHONE ENCOUNTER
[facility] DENIED the in lab sleep study.  Would you like to do a Peer to Peer or switch to a HST?    Peer to Peer [id number] / ref # [id number]  Home study order entered, if needed.    Consult 08/31/17 with GUCCI Javier  Next f/v 09/29/17 with GUCCI Javier routed to Concepcion

## 2017-09-15 NOTE — TELEPHONE ENCOUNTER
Here today. Performed 9/15/17 at 10:19 AM. Updated information with significant hypertension, diastolic dysfunction however polysomnogram was denied.    Home sleep study. Physician informed me that if the home sleep study was not diagnostic or showed no signs of obstructive sleep apnea that an in lab could then be ordered and would be automatically processed through insurance.

## 2017-09-15 NOTE — TELEPHONE ENCOUNTER
Patient notified.  Scheduled home study 10/3/17 and f/v 10/06/17.    Please sign order for Home Sleep Test.

## 2017-09-16 DIAGNOSIS — I50.33 ACUTE ON CHRONIC DIASTOLIC CONGESTIVE HEART FAILURE (HCC): ICD-10-CM

## 2017-09-18 RX ORDER — METOLAZONE 5 MG/1
TABLET ORAL
Qty: 30 TAB | Refills: 1 | Status: SHIPPED | OUTPATIENT
Start: 2017-09-18 | End: 2017-12-01 | Stop reason: SDUPTHER

## 2017-10-03 ENCOUNTER — APPOINTMENT (OUTPATIENT)
Dept: SLEEP MEDICINE | Facility: MEDICAL CENTER | Age: 47
End: 2017-10-03
Payer: COMMERCIAL

## 2017-10-06 ENCOUNTER — APPOINTMENT (OUTPATIENT)
Dept: PULMONOLOGY | Facility: HOSPICE | Age: 47
End: 2017-10-06
Payer: COMMERCIAL

## 2017-10-13 ENCOUNTER — HOME STUDY (OUTPATIENT)
Dept: SLEEP MEDICINE | Facility: MEDICAL CENTER | Age: 47
End: 2017-10-13
Attending: NURSE PRACTITIONER
Payer: COMMERCIAL

## 2017-10-13 DIAGNOSIS — G47.33 OSA (OBSTRUCTIVE SLEEP APNEA): ICD-10-CM

## 2017-10-13 PROCEDURE — 95806 SLEEP STUDY UNATT&RESP EFFT: CPT | Performed by: INTERNAL MEDICINE

## 2017-10-18 ENCOUNTER — APPOINTMENT (OUTPATIENT)
Dept: PULMONOLOGY | Facility: HOSPICE | Age: 47
End: 2017-10-18
Payer: COMMERCIAL

## 2017-11-21 ENCOUNTER — TELEPHONE (OUTPATIENT)
Dept: PULMONOLOGY | Facility: HOSPICE | Age: 47
End: 2017-11-21

## 2017-11-21 DIAGNOSIS — G47.33 OSA (OBSTRUCTIVE SLEEP APNEA): ICD-10-CM

## 2017-11-21 NOTE — TELEPHONE ENCOUNTER
Pt called requesting the results to the CNOX test done on 10/18/17.    Last OV: 8/31/17- Stella  Next OV: 1 month return- no visit on file  COPD    This message was only sent to the pool for Erica Javier isn't in.

## 2017-11-21 NOTE — TELEPHONE ENCOUNTER
Samia,    Spoke w/ pt and he states that he can not make an ov for he does not have the money to pay for this and doesn't know what he is going to do.

## 2017-11-22 NOTE — TELEPHONE ENCOUNTER
Concepcion is not in the office today. I contacted pt via t/c. I reviewed home sleep study in detail which indicates severe obstructive sleep apnea with an AHI of 64.7. We discussed pathophysiology of sleep apnea in detail and various treatment options. He is amendable to a trial of airway pressurization. I ordered Auto CPAP. He needs to have an 8 week follow up with compliance card download. Please verify what DME to send order to and notify patient.

## 2017-12-01 DIAGNOSIS — I50.33 ACUTE ON CHRONIC DIASTOLIC CONGESTIVE HEART FAILURE (HCC): ICD-10-CM

## 2017-12-04 RX ORDER — METOLAZONE 5 MG/1
5 TABLET ORAL DAILY
Qty: 90 TAB | Refills: 1 | Status: SHIPPED | OUTPATIENT
Start: 2017-12-04 | End: 2018-05-15

## 2017-12-04 NOTE — TELEPHONE ENCOUNTER
Was the patient seen in the last year in this department? Yes     Does patient have an active prescription for medications requested? No     Received Request Via: Pharmacy     Last Visit: 7/19/17    Last Labs: 5/10/17

## 2017-12-11 ENCOUNTER — OFFICE VISIT (OUTPATIENT)
Dept: MEDICAL GROUP | Facility: MEDICAL CENTER | Age: 47
End: 2017-12-11
Payer: COMMERCIAL

## 2017-12-11 ENCOUNTER — TELEPHONE (OUTPATIENT)
Dept: PULMONOLOGY | Facility: HOSPICE | Age: 47
End: 2017-12-11

## 2017-12-11 VITALS
BODY MASS INDEX: 45.1 KG/M2 | HEIGHT: 70 IN | SYSTOLIC BLOOD PRESSURE: 114 MMHG | OXYGEN SATURATION: 93 % | HEART RATE: 51 BPM | WEIGHT: 315 LBS | RESPIRATION RATE: 16 BRPM | TEMPERATURE: 97.3 F | DIASTOLIC BLOOD PRESSURE: 64 MMHG

## 2017-12-11 DIAGNOSIS — G47.33 OSA (OBSTRUCTIVE SLEEP APNEA): ICD-10-CM

## 2017-12-11 DIAGNOSIS — I10 ESSENTIAL HYPERTENSION: ICD-10-CM

## 2017-12-11 DIAGNOSIS — I50.32 CHRONIC DIASTOLIC CONGESTIVE HEART FAILURE, NYHA CLASS 3 (HCC): ICD-10-CM

## 2017-12-11 DIAGNOSIS — G47.01 INSOMNIA DUE TO MEDICAL CONDITION: ICD-10-CM

## 2017-12-11 PROCEDURE — 99214 OFFICE O/P EST MOD 30 MIN: CPT | Performed by: FAMILY MEDICINE

## 2017-12-11 RX ORDER — ALPRAZOLAM 1 MG/1
1-3 TABLET ORAL NIGHTLY PRN
Qty: 90 TAB | Refills: 0 | Status: SHIPPED | OUTPATIENT
Start: 2017-12-11

## 2017-12-11 NOTE — PROGRESS NOTES
Subjective:   Chief Complaint/History of Present Illness:  Giacomo Hunter is a 47 y.o. male established patient who presents today to discuss the following medical conditions:    Insomnia due to medical condition  Patient with main concern today regarding insomnia secondary to the pulmonary appliances he has to use in order to administer the positive pressure ventilation to sleep in order to address his obstructive sleep apnea.    Basically, patient states that he is having trouble with both falling asleep as well as maintaining sleep secondary to the anxiety that can be provoked by positive pressure ventilation, particularly after the machine reset's when he goes to use the restroom secondary to his nocturia at night. Nocturia may be secondary to metolazone the patient's taking, as well as benign prostatic hyperplasia. However, patient does not report any BPH symptoms (see review of systems as below).    Patient states that it is also very claustrophobic to wear the facemask, particularly since the straps are tight, able to give him headaches.    Overall, patient estimates that he may be old to obtain a proximally 4 to 4.5hours of sleep, however this is not continuous, and is not restful.    Patient states that Xanax seems to help him be able to get to sleep, and he may have to re-dose himself and melena nor to maintain sleep. Patient is not having any symptoms of respiratory depression or distress, either through the night or in the morning time. Patient does not drink alcohol regular basis.    ROS is positive for daytime sleepiness, headaches, generalized weakness/fatigue, as well as fingertip tingling that seems to be improving secondary to use of CPAP machine.    ROS is NEGATIVE for nocturia, polyuria, increased frequency of urination, urinary hesitancy, feeling of incomplete voiding, difficulty initiation urine stream, hematuria, pyuria, erectile dysfunction    ADRIANNA (obstructive sleep apnea)  Chronic,  "uncontrolled, improving. Please see notes from same date of service 12/11/2017 regarding insomnia.    Chronic diastolic congestive heart failure, NYHA class 3 (CMS-HCC)  Patient states that his chronic diastolic congestive heart failure symptoms are well-controlled on current therapy which includes metolazone 5 mg by mouth daily as well as potassium chloride 40 mEq by mouth twice a day. Of note, we have not rechecked renal function as well as electrolytes recently, therefore we will order those labs today.    HTN (hypertension)  Chronic, stable, well controlled on a combination of doxazosin 2 mg by mouth daily, hydralazine 50 mg by mouth 3 times a day, lisinopril 40 mg by mouth twice a day, Lopressor 50 mg by mouth twice a day, and metolazone 5 mg by mouth daily.    Patient is very pleased that his blood pressures are stable, and well-controlled.    ROS is NEGATIVE for dizziness, generalized weakness/fatigue, cold sweats, dizziness,  vision/hearing changes, jaw pain/paresthesias, BUE pain/paresthesias/numbness/weakness, chest pain/pressure, palpitations, dyspnea, nausea, RUQ abdominal pain, oliguria/anuria, BLE edema.    BMI 45.0-49.9, adult (CMS-HCC)  Chronic, stable, uncontrolled, patient attributes this to disrupted sleep-wake cycle secondary to his obstructive sleep apnea and difficulties with insomnia. Please see notes from same date of service 12/11/2017 regarding insomnia as well as ADRIANNA.    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    ROS is NEGATIVE for blurred vision, polydipsia, polyuria, diaphoresis, palpitations, fatigue, irritability, flank pain, BLE paresthesias.      Patient Active " Problem List    Diagnosis Date Noted   • Chronic diastolic congestive heart failure, NYHA class 3 (CMS-HCC) 04/26/2017     Priority: High   • Morbid obesity due to excess calories (CMS-HCC) 05/01/2017     Priority: Medium   • COPD (chronic obstructive pulmonary disease) (CMS-HCC) 07/19/2017   • Insomnia due to medical condition 05/18/2017   • Chronic bilateral low back pain without sciatica 04/26/2017   • Status post right hip replacement 04/26/2017   • Chronic fatigue 04/26/2017   • Low testosterone level in male 04/26/2017   • Tobacco use disorder 12/02/2016   • Polycythemia secondary to smoking 12/02/2016   • BMI 45.0-49.9, adult (CMS-HCC) 12/02/2016   • ADRIANNA (obstructive sleep apnea) 07/04/2013   • HTN (hypertension) 07/04/2013       Additional History:   Allergies:    Naproxen; Ambien [zolpidem]; Flu virus vaccine; and Olmesartan-amlodipine-hctz     Current Medications:     Current Outpatient Prescriptions   Medication Sig Dispense Refill   • alprazolam (XANAX) 1 MG Tab Take 1-3 Tabs by mouth at bedtime as needed for Sleep. 90 Tab 0   • metolazone (ZAROXOLYN) 5 MG Tab Take 1 Tab by mouth every day. 90 Tab 1   • doxazosin (CARDURA) 2 MG Tab TAKE ONE TABLET BY MOUTH AT BEDTIME. HOLD IF SYSTOLIC BLOOD PRESSURE IS LESS THAN 120 90 Tab 1   • metoprolol (LOPRESSOR) 50 MG Tab TAKE ONE TABLET BY MOUTH TWICE A  Tab 0   • fluticasone-salmeterol (ADVAIR) 250-50 MCG/DOSE AEROSOL POWDER, BREATH ACTIVATED Inhale 1 Puff by mouth every 12 hours. 1 Inhaler 1   • albuterol 108 (90 BASE) MCG/ACT Aero Soln inhalation aerosol Inhale 2 Puffs by mouth every 6 hours as needed for Shortness of Breath. 8.5 g 1   • hydrALAZINE (APRESOLINE) 50 MG Tab Take 1 Tab by mouth 3 times a day. 270 Tab 1   • lisinopril (PRINIVIL, ZESTRIL) 40 MG tablet Take 1 Tab by mouth 2 times a day. 180 Tab 1   • potassium chloride (KLOR-CON) 20 MEQ Pack Take 2 Packets by mouth 2 times a day. Indications: lasix 60 Packet 2   • magnesium oxide (MAG-OX) 400  "MG Tab Take 500 mg by mouth every day.     • oxycodone (OXY-IR) 30 MG immediate release tablet Take 1 Tab by mouth every four hours as needed for Moderate Pain. 120 Tab 0   • cetirizine (ZYRTEC) 10 MG TABS Take 10 mg by mouth every day.     • levalbuterol (XOPENEX HFA) 45 MCG/ACT inhaler Inhale 2 Puffs by mouth every four hours as needed for Shortness of Breath. 1 Inhaler 5   • nitroGLYCERIN (NITROSTAT) 0.3 MG SL tablet Place 1 Tab under tongue See Admin Instructions. No more than 3 in 15 minutes.  If chest pain is still unresolved, call 911. 100 Tab 0     No current facility-administered medications for this visit.         Social History:     Social History   Substance Use Topics   • Smoking status: Former Smoker     Packs/day: 2.50     Years: 25.00     Types: Cigarettes     Start date: 1/1/1990     Quit date: 12/8/2016   • Smokeless tobacco: Never Used      Comment: 2-3ppd [id number]; Currently Vaping   • Alcohol use 6.0 oz/week     7 Glasses of wine, 5 Cans of beer per week      Comment: 2 x per week       ROS:     - NOTE: All other systems reviewed and are negative, except as in HPI.     Objective:   Physical Exam:    Vitals: Blood pressure 114/64, pulse (!) 51, temperature 36.3 °C (97.3 °F), resp. rate 16, height 1.778 m (5' 10\"), weight (!) 143.2 kg (315 lb 11.2 oz), SpO2 93 %.   BMI: Body mass index is 45.3 kg/m².   General/Constitutional: Vitals as above, Well nourished, well developed Morbidly obese male in mild acute distress   Head/Eyes: Head is grossly normal & atraumatic, bilateral conjunctivae clear and not injected, bilateral EOMI, bilateral PERRL   ENT: Bilateral external ears grossly normal in appearance, Hearing grossly intact, External nares normal in appearance and without discharge/bleeding   Respiratory: No respiratory distress, bilateral lungs are clear to ausculation in all lung fields (anterior/lateral/posterior), no wheezing/rhonchi/rales, Oxygen saturation noted to be minimally low at 93%, " which may be due to his anxiety/distress.   Cardiovascular: Regular rate and rhythm without murmur/gallops/rubs, distal pulses are intact and equal bilaterally (radial, posterior tibial), no bilateral lower extremity edema   MSK: Gait grossly normal & not antalgic   Integumentary: No apparent rashes   Psych: Judgment grossly appropriate, no apparent depression/anxiety    Health Maintenance:     - Not addressed at this visit    Imaging/Labs:     -Reviewed and interpreted as in history of present illness above    Assessment and Plan:   1. Insomnia due to medical condition  Chronic, uncontrolled, likely secondary to reasons as stated in history of present illness above. We will trial him on Xanax that can be increased to maximum of 2 mg at one time, however patient can take a second dose of 1 mg in the middle the night. We will reexamine patient's ability to sleep and his efficacy on the CPAP machine in one month.   - alprazolam (XANAX) 1 MG Tab; Take 1-3 Tabs by mouth at bedtime as needed for Sleep.  Dispense: 90 Tab; Refill: 0    2. ADRIANNA (obstructive sleep apnea)  Chronic, uncontrolled, however improving with use of CPAP. See #1 for further details.    3. Chronic diastolic congestive heart failure, NYHA class 3 (CMS-HCC)  4. Essential hypertension  Chronic, stable, well controlled, apart from unknown control of renal function and electrolytes. Recheck with CMP as below.   - COMP METABOLIC PANEL; Future    5. BMI 45.0-49.9, adult (CMS-HCC)  Chronic, uncontrolled, worsening. Likely secondary to #1 and #2 as above. CMP to evaluate for development of any fatty liver disease.        RTC: in 1 month for Insomnia + ADRIANNA follow-up.    PLEASE NOTE: This dictation was created using voice recognition software. I have made every reasonable attempt to correct obvious errors, but I expect that there are errors of grammar and possibly content that I did not discover before finalizing the note.

## 2017-12-11 NOTE — ASSESSMENT & PLAN NOTE
Patient with main concern today regarding insomnia secondary to the pulmonary appliances he has to use in order to administer the positive pressure ventilation to sleep in order to address his obstructive sleep apnea.    Basically, patient states that he is having trouble with both falling asleep as well as maintaining sleep secondary to the anxiety that can be provoked by positive pressure ventilation, particularly after the machine reset's when he goes to use the restroom secondary to his nocturia at night. Nocturia may be secondary to metolazone the patient's taking, as well as benign prostatic hyperplasia. However, patient does not report any BPH symptoms (see review of systems as below).    Patient states that it is also very claustrophobic to wear the facemask, particularly since the straps are tight, able to give him headaches.    Overall, patient estimates that he may be old to obtain a proximally 4 to 4.5hours of sleep, however this is not continuous, and is not restful.    Patient states that Xanax seems to help him be able to get to sleep, and he may have to re-dose himself and melena nor to maintain sleep. Patient is not having any symptoms of respiratory depression or distress, either through the night or in the morning time. Patient does not drink alcohol regular basis.    ROS is positive for daytime sleepiness, headaches, generalized weakness/fatigue, as well as fingertip tingling that seems to be improving secondary to use of CPAP machine.    ROS is NEGATIVE for nocturia, polyuria, increased frequency of urination, urinary hesitancy, feeling of incomplete voiding, difficulty initiation urine stream, hematuria, pyuria, erectile dysfunction

## 2017-12-11 NOTE — ASSESSMENT & PLAN NOTE
Chronic, uncontrolled, improving. Please see notes from same date of service 12/11/2017 regarding insomnia.

## 2017-12-11 NOTE — TELEPHONE ENCOUNTER
Pt called and is having all sorts of issues adjusting to his new AUTOCPAP.  After advising he bring in his chip for a DL he requested an actual OV so he could discuss everything since technically he has only been seen for his consult and his SS was denied.  Erica Javier had an opening tomorrow at sleep center and I scheduled him then

## 2017-12-11 NOTE — ASSESSMENT & PLAN NOTE
Chronic, stable, well controlled on a combination of doxazosin 2 mg by mouth daily, hydralazine 50 mg by mouth 3 times a day, lisinopril 40 mg by mouth twice a day, Lopressor 50 mg by mouth twice a day, and metolazone 5 mg by mouth daily.    Patient is very pleased that his blood pressures are stable, and well-controlled.    ROS is NEGATIVE for dizziness, generalized weakness/fatigue, cold sweats, dizziness,  vision/hearing changes, jaw pain/paresthesias, BUE pain/paresthesias/numbness/weakness, chest pain/pressure, palpitations, dyspnea, nausea, RUQ abdominal pain, oliguria/anuria, BLE edema.

## 2017-12-11 NOTE — ASSESSMENT & PLAN NOTE
"Chronic, stable, uncontrolled, patient attributes this to disrupted sleep-wake cycle secondary to his obstructive sleep apnea and difficulties with insomnia. Please see notes from same date of service 12/11/2017 regarding insomnia as well as ADRIANNA.    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    ROS is NEGATIVE for blurred vision, polydipsia, polyuria, diaphoresis, palpitations, fatigue, irritability, flank pain, BLE paresthesias.  "

## 2017-12-11 NOTE — ASSESSMENT & PLAN NOTE
Patient states that his chronic diastolic congestive heart failure symptoms are well-controlled on current therapy which includes metolazone 5 mg by mouth daily as well as potassium chloride 40 mEq by mouth twice a day. Of note, we have not rechecked renal function as well as electrolytes recently, therefore we will order those labs today.

## 2017-12-18 ENCOUNTER — TELEPHONE (OUTPATIENT)
Dept: MEDICAL GROUP | Facility: MEDICAL CENTER | Age: 47
End: 2017-12-18

## 2017-12-18 NOTE — TELEPHONE ENCOUNTER
Pt called requesting to get antibiotic for bronchitis. Pt was told that he needs to be seen in either UC or schedule an office visit. Pt was very upset that he was not able to get rx for antibiotic he stated that he is very sick and won't have the time to go to UC or to see someone at the clinic.

## 2018-03-09 ENCOUNTER — PATIENT MESSAGE (OUTPATIENT)
Dept: HEALTH INFORMATION MANAGEMENT | Facility: OTHER | Age: 48
End: 2018-03-09

## 2018-05-15 ENCOUNTER — TELEPHONE (OUTPATIENT)
Dept: MEDICAL GROUP | Facility: MEDICAL CENTER | Age: 48
End: 2018-05-15

## 2018-05-15 DIAGNOSIS — I10 ESSENTIAL HYPERTENSION: ICD-10-CM

## 2018-05-15 RX ORDER — HYDROCHLOROTHIAZIDE 25 MG/1
25 TABLET ORAL DAILY
Qty: 90 TAB | Refills: 0 | Status: SHIPPED | OUTPATIENT
Start: 2018-05-15

## 2018-05-16 NOTE — TELEPHONE ENCOUNTER
I spoke to pharmacist, he stated that chlorthalidone and HCTZ  are comparable medications to metolazone.

## 2020-04-27 NOTE — ASSESSMENT & PLAN NOTE
"Patient acknowledges diagnosis of morbid obesity, has tried to change his diet to \" eating\" as described in dietary history below.      ROS is NEGATIVE for: cold or heat intolerance, anxiety/depression, chest pain/pressure, hair thickening/coarsening/falling out/thinning, skin changes, diarrhea/constipation.    ROS is NEGATIVE for blurred vision, polydipsia, polyuria, diaphoresis, palpitations, fatigue, irritability, flank pain, BLE paresthesias.    Dietary History  ---  B: English Muffin + Hardboiled Eggs (~10eggs per week) OR Cereal (Kashi) OR Ramen Noodles    L: [employer] [place] (Cold cuts, swiss cheese + parmesan, no oil/vinegar, no salt)    D: Roasted chicken OR Fish, veggies (sauteed, no-salt substitute), salad     Pickled [place] or other preserved meats (Sardines in oil, lox on bagels + cream cheese) -- 1x/week    Cheese 2-3x per week    Pizza -- 1x every 2months  " dry/warm

## 2024-07-03 NOTE — TELEPHONE ENCOUNTER
They had a home sleep study 10/13/2017. Please arrange OV for sleep study results.    Detail Level: Zone Detail Level: Detailed Detail Level: Simple

## 2025-02-05 NOTE — PROGRESS NOTES
Bedside report received. POC discussed with pt; all questions answered at this time. Call light and personal belongings within reach. Proper fall precautions in place.    hide